# Patient Record
Sex: MALE | Race: BLACK OR AFRICAN AMERICAN | URBAN - METROPOLITAN AREA
[De-identification: names, ages, dates, MRNs, and addresses within clinical notes are randomized per-mention and may not be internally consistent; named-entity substitution may affect disease eponyms.]

---

## 2023-10-07 ENCOUNTER — INPATIENT (INPATIENT)
Facility: HOSPITAL | Age: 48
LOS: 3 days | Discharge: HOME | DRG: 274 | End: 2023-10-11
Attending: INTERNAL MEDICINE | Admitting: INTERNAL MEDICINE
Payer: COMMERCIAL

## 2023-10-07 VITALS
HEART RATE: 82 BPM | RESPIRATION RATE: 18 BRPM | SYSTOLIC BLOOD PRESSURE: 120 MMHG | DIASTOLIC BLOOD PRESSURE: 77 MMHG | OXYGEN SATURATION: 96 %

## 2023-10-07 DIAGNOSIS — Z98.890 OTHER SPECIFIED POSTPROCEDURAL STATES: Chronic | ICD-10-CM

## 2023-10-07 DIAGNOSIS — R55 SYNCOPE AND COLLAPSE: ICD-10-CM

## 2023-10-07 DIAGNOSIS — E78.5 HYPERLIPIDEMIA, UNSPECIFIED: ICD-10-CM

## 2023-10-07 DIAGNOSIS — I10 ESSENTIAL (PRIMARY) HYPERTENSION: ICD-10-CM

## 2023-10-07 DIAGNOSIS — Z86.79 PERSONAL HISTORY OF OTHER DISEASES OF THE CIRCULATORY SYSTEM: ICD-10-CM

## 2023-10-07 DIAGNOSIS — I50.22 CHRONIC SYSTOLIC (CONGESTIVE) HEART FAILURE: ICD-10-CM

## 2023-10-07 DIAGNOSIS — I48.91 UNSPECIFIED ATRIAL FIBRILLATION: ICD-10-CM

## 2023-10-07 DIAGNOSIS — S21.339A PUNCTURE WOUND WITHOUT FOREIGN BODY OF UNSPECIFIED FRONT WALL OF THORAX WITH PENETRATION INTO THORACIC CAVITY, INITIAL ENCOUNTER: Chronic | ICD-10-CM

## 2023-10-07 DIAGNOSIS — M19.90 UNSPECIFIED OSTEOARTHRITIS, UNSPECIFIED SITE: ICD-10-CM

## 2023-10-07 LAB
A1C WITH ESTIMATED AVERAGE GLUCOSE RESULT: 5.1 % — SIGNIFICANT CHANGE UP (ref 4–5.6)
ALBUMIN SERPL ELPH-MCNC: 4 G/DL — SIGNIFICANT CHANGE UP (ref 3.3–5)
ALP SERPL-CCNC: 91 U/L — SIGNIFICANT CHANGE UP (ref 40–120)
ALT FLD-CCNC: 22 U/L — SIGNIFICANT CHANGE UP (ref 10–45)
ANION GAP SERPL CALC-SCNC: 14 MMOL/L — SIGNIFICANT CHANGE UP (ref 5–17)
AST SERPL-CCNC: 22 U/L — SIGNIFICANT CHANGE UP (ref 10–40)
BILIRUB SERPL-MCNC: 0.6 MG/DL — SIGNIFICANT CHANGE UP (ref 0.2–1.2)
BUN SERPL-MCNC: 21 MG/DL — SIGNIFICANT CHANGE UP (ref 7–23)
CALCIUM SERPL-MCNC: 9.7 MG/DL — SIGNIFICANT CHANGE UP (ref 8.4–10.5)
CHLORIDE SERPL-SCNC: 104 MMOL/L — SIGNIFICANT CHANGE UP (ref 96–108)
CHOLEST SERPL-MCNC: 130 MG/DL — SIGNIFICANT CHANGE UP
CO2 SERPL-SCNC: 20 MMOL/L — LOW (ref 22–31)
CREAT SERPL-MCNC: 1.19 MG/DL — SIGNIFICANT CHANGE UP (ref 0.5–1.3)
EGFR: 74 ML/MIN/1.73M2 — SIGNIFICANT CHANGE UP
ESTIMATED AVERAGE GLUCOSE: 100 MG/DL — SIGNIFICANT CHANGE UP (ref 68–114)
GLUCOSE SERPL-MCNC: 158 MG/DL — HIGH (ref 70–99)
HCT VFR BLD CALC: 42.7 % — SIGNIFICANT CHANGE UP (ref 39–50)
HDLC SERPL-MCNC: 65 MG/DL — SIGNIFICANT CHANGE UP
HGB BLD-MCNC: 13.8 G/DL — SIGNIFICANT CHANGE UP (ref 13–17)
LIPID PNL WITH DIRECT LDL SERPL: 54 MG/DL — SIGNIFICANT CHANGE UP
MAGNESIUM SERPL-MCNC: 2.3 MG/DL — SIGNIFICANT CHANGE UP (ref 1.6–2.6)
MCHC RBC-ENTMCNC: 32 PG — SIGNIFICANT CHANGE UP (ref 27–34)
MCHC RBC-ENTMCNC: 32.3 GM/DL — SIGNIFICANT CHANGE UP (ref 32–36)
MCV RBC AUTO: 99.1 FL — SIGNIFICANT CHANGE UP (ref 80–100)
NON HDL CHOLESTEROL: 65 MG/DL — SIGNIFICANT CHANGE UP
NRBC # BLD: 0 /100 WBCS — SIGNIFICANT CHANGE UP (ref 0–0)
PLATELET # BLD AUTO: 200 K/UL — SIGNIFICANT CHANGE UP (ref 150–400)
POTASSIUM SERPL-MCNC: 4.2 MMOL/L — SIGNIFICANT CHANGE UP (ref 3.5–5.3)
POTASSIUM SERPL-SCNC: 4.2 MMOL/L — SIGNIFICANT CHANGE UP (ref 3.5–5.3)
PROT SERPL-MCNC: 7.5 G/DL — SIGNIFICANT CHANGE UP (ref 6–8.3)
RBC # BLD: 4.31 M/UL — SIGNIFICANT CHANGE UP (ref 4.2–5.8)
RBC # FLD: 12.8 % — SIGNIFICANT CHANGE UP (ref 10.3–14.5)
SODIUM SERPL-SCNC: 138 MMOL/L — SIGNIFICANT CHANGE UP (ref 135–145)
TRIGL SERPL-MCNC: 53 MG/DL — SIGNIFICANT CHANGE UP
WBC # BLD: 4.46 K/UL — SIGNIFICANT CHANGE UP (ref 3.8–10.5)
WBC # FLD AUTO: 4.46 K/UL — SIGNIFICANT CHANGE UP (ref 3.8–10.5)

## 2023-10-07 PROCEDURE — 99223 1ST HOSP IP/OBS HIGH 75: CPT

## 2023-10-07 PROCEDURE — 93306 TTE W/DOPPLER COMPLETE: CPT | Mod: 26

## 2023-10-07 PROCEDURE — 93010 ELECTROCARDIOGRAM REPORT: CPT

## 2023-10-07 RX ORDER — SACUBITRIL AND VALSARTAN 24; 26 MG/1; MG/1
1 TABLET, FILM COATED ORAL
Refills: 0 | Status: DISCONTINUED | OUTPATIENT
Start: 2023-10-07 | End: 2023-10-11

## 2023-10-07 RX ORDER — METOPROLOL TARTRATE 50 MG
50 TABLET ORAL ONCE
Refills: 0 | Status: DISCONTINUED | OUTPATIENT
Start: 2023-10-07 | End: 2023-10-07

## 2023-10-07 RX ORDER — LIDOCAINE 4 G/100G
1 CREAM TOPICAL ONCE
Refills: 0 | Status: COMPLETED | OUTPATIENT
Start: 2023-10-07 | End: 2023-10-08

## 2023-10-07 RX ORDER — ATORVASTATIN CALCIUM 80 MG/1
40 TABLET, FILM COATED ORAL AT BEDTIME
Refills: 0 | Status: DISCONTINUED | OUTPATIENT
Start: 2023-10-07 | End: 2023-10-11

## 2023-10-07 RX ORDER — APIXABAN 2.5 MG/1
5 TABLET, FILM COATED ORAL
Refills: 0 | Status: DISCONTINUED | OUTPATIENT
Start: 2023-10-07 | End: 2023-10-11

## 2023-10-07 RX ORDER — INFLUENZA VIRUS VACCINE 15; 15; 15; 15 UG/.5ML; UG/.5ML; UG/.5ML; UG/.5ML
0.5 SUSPENSION INTRAMUSCULAR ONCE
Refills: 0 | Status: DISCONTINUED | OUTPATIENT
Start: 2023-10-07 | End: 2023-10-11

## 2023-10-07 RX ORDER — DAPAGLIFLOZIN 10 MG/1
10 TABLET, FILM COATED ORAL DAILY
Refills: 0 | Status: DISCONTINUED | OUTPATIENT
Start: 2023-10-07 | End: 2023-10-08

## 2023-10-07 RX ORDER — METOPROLOL TARTRATE 50 MG
50 TABLET ORAL DAILY
Refills: 0 | Status: DISCONTINUED | OUTPATIENT
Start: 2023-10-07 | End: 2023-10-11

## 2023-10-07 RX ORDER — METOPROLOL TARTRATE 50 MG
50 TABLET ORAL
Refills: 0 | Status: DISCONTINUED | OUTPATIENT
Start: 2023-10-07 | End: 2023-10-07

## 2023-10-07 RX ADMIN — SACUBITRIL AND VALSARTAN 1 TABLET(S): 24; 26 TABLET, FILM COATED ORAL at 22:16

## 2023-10-07 RX ADMIN — SACUBITRIL AND VALSARTAN 1 TABLET(S): 24; 26 TABLET, FILM COATED ORAL at 11:30

## 2023-10-07 RX ADMIN — ATORVASTATIN CALCIUM 40 MILLIGRAM(S): 80 TABLET, FILM COATED ORAL at 22:14

## 2023-10-07 RX ADMIN — APIXABAN 5 MILLIGRAM(S): 2.5 TABLET, FILM COATED ORAL at 10:15

## 2023-10-07 RX ADMIN — Medication 50 MILLIGRAM(S): at 10:14

## 2023-10-07 RX ADMIN — DAPAGLIFLOZIN 10 MILLIGRAM(S): 10 TABLET, FILM COATED ORAL at 10:14

## 2023-10-07 RX ADMIN — APIXABAN 5 MILLIGRAM(S): 2.5 TABLET, FILM COATED ORAL at 22:14

## 2023-10-07 NOTE — CONSULT NOTE ADULT - PROBLEM SELECTOR RECOMMENDATION 3
Uncertain etiology of CHF, but reportedly with recovered EF. Unclear why he still has a LifeVest.    -discuss with outpatient Cardiologist  -if no history of VT and EF >35% on echo, LifeVest does not clearly offer benefit; if he meets indications for primary prevention ICD, this could be considered

## 2023-10-07 NOTE — H&P ADULT - NSICDXPASTMEDICALHX_GEN_ALL_CORE_FT
PAST MEDICAL HISTORY:  Arthritis     H/O rheumatic heart disease     Heart failure with mildly reduced ejection fraction (HFmrEF)     HLD (hyperlipidemia)     HTN (hypertension)

## 2023-10-07 NOTE — H&P ADULT - MUSCULOSKELETAL
normal/ROM intact/normal gait/strength 5/5 bilateral upper extremities/strength 5/5 bilateral lower extremities normal/ROM intact/calf tenderness/normal gait/strength 5/5 bilateral upper extremities/decreased strength

## 2023-10-07 NOTE — CONSULT NOTE ADULT - ASSESSMENT
48-year-old man with history of hypertension, atrial fibrillation/flutter, nonischemic cardiomyopathy with low normal EF, but still wearing a LifeVest for unclear reasons, atrial fibrillation/flutter with associated hypercoagulable state on long-term anticoagulation with Eliquis, 2 prior cardiac ablations (unclear details) at [facility], and now recurrence of dizziness/syncope transferred from [facility] with reports of atrial fibrillation with RVR associated with his symptoms for repeat ablation.  We will be crucial to understand what he has had done prior in order to plan for this procedure.  The LifeVest strips of his episodes will also be helpful to obtain if possible.    Case discussed with Dr. Major. 48-year-old man with history of hypertension, atrial fibrillation/flutter, nonischemic cardiomyopathy with low normal EF, but still wearing a LifeVest for unclear reasons, atrial fibrillation/flutter with associated hypercoagulable state on long-term anticoagulation with Eliquis, 2 prior cardiac ablations (unclear details) at [facility], and now recurrence of dizziness/syncope transferred from [facility] with reports of atrial fibrillation with RVR associated with his symptoms for repeat ablation.  We will be crucial to understand what he has had done prior in order to plan for this procedure.  The LifeVest strips of his episodes will also be helpful to obtain if possible.    Case discussed with Dr. Major. A total of 70 minutes was spent with >50% on counseling and coordination of care.

## 2023-10-07 NOTE — H&P ADULT - NSICDXFAMILYHX_GEN_ALL_CORE_FT
FAMILY HISTORY:  Father  Still living? Unknown  FH: CHF (congestive heart failure), Age at diagnosis: Age Unknown    Sibling  Still living? Unknown  FHx: atrial fibrillation, Age at diagnosis: Age Unknown

## 2023-10-07 NOTE — H&P ADULT - PROBLEM SELECTOR PLAN 1
HPI, CHADSVASC, Cardiologist/EP  -EKG  -BNP, TSH  -Trop  -Echo  -Home meds:  -AC:  -Rate control:  -EP consulted: Plan for SHOAIB/DCCV/Ablation? By report, presentation consistent with AFib probably with RVR.  -EKG 10/7: NSR @   -F/u TSH, FT4  -Trop neg x 2 from [facility]  -Echo 10/7:  -obtain OSH records and upload to chart with ECGs and cardiac testing if available  -given [id number] of at least 2 for HTN and CHF, if there was any interruption in his a/c he will need his ROSITA cleared prior to ablation (could be done with SHOAIB, cardiac CTA, or ICE imaging at the time of the procedure); overall likely low risk given sinus rhythm and no reported interruption from the patient's standpoint  -please obtain prior ablation records from [facility]  -continue metoprolol succinate 50mg daily  -check CBC, BMP, Mg, TSH, FT4, coags, LFTs prior to Monday  -tentative plan for ablation Monday with Dr. Major; please keep NPO Sunday night after midnight  -c/w Eliquis 5mg PO BID and metoprolol 50mg po qd  -keep on telemetry By report, presentation consistent with AFib probably with RVR.  -EKG 10/7: NSR @   -F/u TSH, FT4  [facility]  -Echo 10/7: NSR @ 66 BPM. w/ LVH  -obtain OSH records and upload to chart with ECGs and cardiac testing if available  -given [id number] of at least 2 for HTN and CHF, if there was any interruption in his a/c he will need his ROSITA cleared prior to ablation (could be done with SHOAIB, cardiac CTA, or ICE imaging at the time of the procedure); overall likely low risk given sinus rhythm and no reported interruption from the patient's standpoint  -please obtain prior ablation records from [facility]  -continue metoprolol succinate 50mg daily  -check CBC, BMP, Mg, TSH, FT4, coags, LFTs prior to Monday  -tentative plan for ablation Monday with Dr. Major; please keep NPO Sunday night after midnight  -c/w Eliquis 5mg PO BID and metoprolol 50mg po qd  -keep on telemetry By report, presentation consistent with AFib probably with RVR.  -Echo 10/7: NSR @ 66 BPM. w/ LVH  -F/u TSH, FT4  -Trop neg x 2 from [facility]  -TTE 10/7: LVEF 50% with global hypokinesis, Grade I LV DD, RV size and function normal, No significant valvular disease, No pericardial effusion.  -obtain OSH records and upload to chart with ECGs and cardiac testing if available  -given [id number] of at least 2 for HTN and CHF, if there was any interruption in his a/c he will need his ROSITA cleared prior to ablation (could be done with SHOAIB, cardiac CTA, or ICE imaging at the time of the procedure); overall likely low risk given sinus rhythm and no reported interruption from the patient's standpoint  -please obtain prior ablation records from [facility]  -continue metoprolol succinate 50mg daily  -check CBC, BMP, Mg, TSH, FT4, coags, LFTs prior to Monday  -tentative plan for ablation Monday with Dr. Major; please keep NPO Sunday night after midnight  -c/w Eliquis 5mg PO BID and metoprolol 50mg po qd  -keep on telemetry

## 2023-10-07 NOTE — H&P ADULT - PROBLEM SELECTOR PLAN 2
h/o HF ________________, experiencing _________, exam and results consistent with CHF exacerbation   -Current resp status -   -CE -   -EKG -  -CXR -   -ECHO -   -Home diuretic -  -Meds -   -Strict I/Os - Net _____, last 24 _____, Goal _______  -(Plan for ischemic workup?)  -Daily weights, _____ 1L PO restriction Per Colette note pt has h/o of EF as low as 20% now recovered to 50%.   -CE neg x 2 at [facility]  -EKG as above  -CXR neg at [facility]  -c/w home meds: Farxiga 10mg, entresto 24/26mg PO BID, metoprolol 50mg qd  -Daily weights, 1L PO restriction  -if no history of VT and EF >35% on echo, LifeVest does not clearly offer benefit; if he meets indications for primary prevention ICD, this could be considered.

## 2023-10-07 NOTE — H&P ADULT - PROBLEM SELECTOR PLAN 5
h/o HLD, (not) on home statin  -c/w home med: ________  -f/u lipid panel h/o HLD   - home med: rosuvastatin 10mg qd  - C/w atorvastatin 40mg PO qd  -f/u lipid panel
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
## 2023-10-07 NOTE — H&P ADULT - PROBLEM SELECTOR PLAN 3
Recommend looking at echo to determine whether he has evidence of true RHD. If so, Coumadin is recommended rather than NOACs.
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
## 2023-10-07 NOTE — CONSULT NOTE ADULT - PROBLEM SELECTOR RECOMMENDATION 9
Symptoms/episodes seem related to AFib w/ RVR, although additional records would be very helpful.    -obtain LifeVest strips  -telemetry while in house  -fall precautions  -recommend LE dopplers given his right lower leg discomfort and immobility to check for DVT

## 2023-10-07 NOTE — PATIENT PROFILE ADULT - FALL HARM RISK - HARM RISK INTERVENTIONS
Assistance with ambulation/Assistance OOB with selected safe patient handling equipment/Communicate Risk of Fall with Harm to all staff/Discuss with provider need for PT consult/Monitor gait and stability/Provide patient with walking aids - walker, cane, crutches/Reinforce activity limits and safety measures with patient and family/Sit up slowly, dangle for a short time, stand at bedside before walking/Tailored Fall Risk Interventions/Visual Cue: Yellow wristband and red socks/Bed in lowest position, wheels locked, appropriate side rails in place/Call bell, personal items and telephone in reach/Instruct patient to call for assistance before getting out of bed or chair/Non-slip footwear when patient is out of bed/[place] to call system/Physically safe environment - no spills, clutter or unnecessary equipment/Purposeful Proactive Rounding/Room/bathroom lighting operational, light cord in reach

## 2023-10-07 NOTE — H&P ADULT - PROBLEM SELECTOR PLAN 6
F: None  E: Replete if K<4 or Mag<2  N: DASH Diet  GIppx:   VTEppx: ELiquis 5mg BID  Dispo: [facility] 10/9 Lidocaine patch prn for back pain      F: None  E: Replete if K<4 or Mag<2  N: DASH Diet  GIppx:   VTEppx: ELiquis 5mg BID  Dispo: [facility] 10/9

## 2023-10-07 NOTE — H&P ADULT - NSHPLABSRESULTS_GEN_ALL_CORE
13.8   4.46  )-----------( 200      ( 07 Oct 2023 11:45 )             42.7       10-07    138  |  104  |  21  ----------------------------<  158<H>  4.2   |  20<L>  |  1.19    Ca    9.7      07 Oct 2023 11:45  Mg     2.3     10-07    TPro  7.5  /  Alb  4.0  /  TBili  0.6  /  DBili  x   /  AST  22  /  ALT  22  /  AlkPhos  91  10-07                Urinalysis Basic - ( 07 Oct 2023 11:45 )    Color: x / Appearance: x / SG: x / pH: x  Gluc: 158 mg/dL / Ketone: x  / Bili: x / Urobili: x   Blood: x / Protein: x / Nitrite: x   Leuk Esterase: x / RBC: x / WBC x   Sq Epi: x / Non Sq Epi: x / Bacteria: x        EKG: 13.8   4.46  )-----------( 200      ( 07 Oct 2023 11:45 )             42.7       10-07    138  |  104  |  21  ----------------------------<  158<H>  4.2   |  20<L>  |  1.19    Ca    9.7      07 Oct 2023 11:45  Mg     2.3     10-07    TPro  7.5  /  Alb  4.0  /  TBili  0.6  /  DBili  x   /  AST  22  /  ALT  22  /  AlkPhos  91  10-07                Urinalysis Basic - ( 07 Oct 2023 11:45 )    Color: x / Appearance: x / SG: x / pH: x  Gluc: 158 mg/dL / Ketone: x  / Bili: x / Urobili: x   Blood: x / Protein: x / Nitrite: x   Leuk Esterase: x / RBC: x / WBC x   Sq Epi: x / Non Sq Epi: x / Bacteria: x        EKG NSR @ 66 BPM. w/ LVH

## 2023-10-07 NOTE — H&P ADULT - PROBLEM SELECTOR PLAN 4
h/o HTN, BP (stable____)  -vitals q4h  -c/w home meds:_______ h/o HTN, /77  -vitals q4h  -c/w home meds: Farxiga 10mg, entresto 24/26mg PO BID, metoprolol 50mg qd

## 2023-10-07 NOTE — CONSULT NOTE ADULT - PROBLEM SELECTOR RECOMMENDATION 2
By report, presentation consistent with AFib probably with RVR.    -obtain OSH records and upload to chart with ECGs and cardiac testing if available  -repeat TTE here  -given [id number] of at least 2 for HTN and CHF, if there was any interruption in his a/c he will need his ROSITA cleared prior to ablation (could be done with SHOAIB, cardiac CTA, or ICE imaging at the time of the procedure); overall likely low risk given sinus rhythm and no reported interruption from the patient's standpoint  -please obtain prior ablation records from [facility]  -continue metoprolol succinate 50mg daily  -keep on telemetry  -check CBC, BMP, Mg, TSH, FT4, coags, LFTs prior to Monday  -tentative plan for ablation Monday with Dr. Major; please keep NPO Sunday night after midnight  -continue Eliquis without interruption

## 2023-10-08 LAB
ALBUMIN SERPL ELPH-MCNC: 3.9 G/DL — SIGNIFICANT CHANGE UP (ref 3.3–5)
ALP SERPL-CCNC: 92 U/L — SIGNIFICANT CHANGE UP (ref 40–120)
ALT FLD-CCNC: 17 U/L — SIGNIFICANT CHANGE UP (ref 10–45)
ANION GAP SERPL CALC-SCNC: 9 MMOL/L — SIGNIFICANT CHANGE UP (ref 5–17)
APTT BLD: 33.6 SEC — SIGNIFICANT CHANGE UP (ref 24.5–35.6)
AST SERPL-CCNC: 12 U/L — SIGNIFICANT CHANGE UP (ref 10–40)
BILIRUB DIRECT SERPL-MCNC: <0.2 MG/DL — SIGNIFICANT CHANGE UP (ref 0–0.3)
BILIRUB INDIRECT FLD-MCNC: SIGNIFICANT CHANGE UP MG/DL (ref 0.2–1)
BILIRUB SERPL-MCNC: 0.7 MG/DL — SIGNIFICANT CHANGE UP (ref 0.2–1.2)
BUN SERPL-MCNC: 19 MG/DL — SIGNIFICANT CHANGE UP (ref 7–23)
CALCIUM SERPL-MCNC: 9.4 MG/DL — SIGNIFICANT CHANGE UP (ref 8.4–10.5)
CHLORIDE SERPL-SCNC: 108 MMOL/L — SIGNIFICANT CHANGE UP (ref 96–108)
CO2 SERPL-SCNC: 21 MMOL/L — LOW (ref 22–31)
CREAT SERPL-MCNC: 1.01 MG/DL — SIGNIFICANT CHANGE UP (ref 0.5–1.3)
EGFR: 91 ML/MIN/1.73M2 — SIGNIFICANT CHANGE UP
GLUCOSE SERPL-MCNC: 101 MG/DL — HIGH (ref 70–99)
HCT VFR BLD CALC: 43.4 % — SIGNIFICANT CHANGE UP (ref 39–50)
HGB BLD-MCNC: 13.8 G/DL — SIGNIFICANT CHANGE UP (ref 13–17)
INR BLD: 0.95 — SIGNIFICANT CHANGE UP (ref 0.85–1.18)
MAGNESIUM SERPL-MCNC: 2.1 MG/DL — SIGNIFICANT CHANGE UP (ref 1.6–2.6)
MCHC RBC-ENTMCNC: 31.6 PG — SIGNIFICANT CHANGE UP (ref 27–34)
MCHC RBC-ENTMCNC: 31.8 GM/DL — LOW (ref 32–36)
MCV RBC AUTO: 99.3 FL — SIGNIFICANT CHANGE UP (ref 80–100)
NRBC # BLD: 0 /100 WBCS — SIGNIFICANT CHANGE UP (ref 0–0)
PLATELET # BLD AUTO: 187 K/UL — SIGNIFICANT CHANGE UP (ref 150–400)
POTASSIUM SERPL-MCNC: 4.2 MMOL/L — SIGNIFICANT CHANGE UP (ref 3.5–5.3)
POTASSIUM SERPL-SCNC: 4.2 MMOL/L — SIGNIFICANT CHANGE UP (ref 3.5–5.3)
PROT SERPL-MCNC: 7.3 G/DL — SIGNIFICANT CHANGE UP (ref 6–8.3)
PROTHROM AB SERPL-ACNC: 10.9 SEC — SIGNIFICANT CHANGE UP (ref 9.5–13)
RBC # BLD: 4.37 M/UL — SIGNIFICANT CHANGE UP (ref 4.2–5.8)
RBC # FLD: 12.8 % — SIGNIFICANT CHANGE UP (ref 10.3–14.5)
SODIUM SERPL-SCNC: 138 MMOL/L — SIGNIFICANT CHANGE UP (ref 135–145)
T4 FREE SERPL-MCNC: 1.22 NG/DL — SIGNIFICANT CHANGE UP (ref 0.93–1.7)
TSH SERPL-MCNC: 3.76 UIU/ML — SIGNIFICANT CHANGE UP (ref 0.27–4.2)
WBC # BLD: 4.56 K/UL — SIGNIFICANT CHANGE UP (ref 3.8–10.5)
WBC # FLD AUTO: 4.56 K/UL — SIGNIFICANT CHANGE UP (ref 3.8–10.5)

## 2023-10-08 PROCEDURE — 93970 EXTREMITY STUDY: CPT | Mod: 26

## 2023-10-08 PROCEDURE — 99233 SBSQ HOSP IP/OBS HIGH 50: CPT

## 2023-10-08 RX ORDER — SPIRONOLACTONE 25 MG/1
25 TABLET, FILM COATED ORAL DAILY
Refills: 0 | Status: DISCONTINUED | OUTPATIENT
Start: 2023-10-08 | End: 2023-10-11

## 2023-10-08 RX ADMIN — APIXABAN 5 MILLIGRAM(S): 2.5 TABLET, FILM COATED ORAL at 11:01

## 2023-10-08 RX ADMIN — LIDOCAINE 1 PATCH: 4 CREAM TOPICAL at 08:41

## 2023-10-08 RX ADMIN — Medication 50 MILLIGRAM(S): at 06:52

## 2023-10-08 RX ADMIN — SACUBITRIL AND VALSARTAN 1 TABLET(S): 24; 26 TABLET, FILM COATED ORAL at 22:05

## 2023-10-08 RX ADMIN — SACUBITRIL AND VALSARTAN 1 TABLET(S): 24; 26 TABLET, FILM COATED ORAL at 11:01

## 2023-10-08 RX ADMIN — ATORVASTATIN CALCIUM 40 MILLIGRAM(S): 80 TABLET, FILM COATED ORAL at 22:05

## 2023-10-08 RX ADMIN — DAPAGLIFLOZIN 10 MILLIGRAM(S): 10 TABLET, FILM COATED ORAL at 11:01

## 2023-10-08 RX ADMIN — LIDOCAINE 1 PATCH: 4 CREAM TOPICAL at 19:02

## 2023-10-08 RX ADMIN — APIXABAN 5 MILLIGRAM(S): 2.5 TABLET, FILM COATED ORAL at 22:04

## 2023-10-08 RX ADMIN — SPIRONOLACTONE 25 MILLIGRAM(S): 25 TABLET, FILM COATED ORAL at 16:02
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
## 2023-10-08 NOTE — PROGRESS NOTE ADULT - PROBLEM SELECTOR PLAN 4
h/o HTN, /77  -vitals q4h  -c/w home meds: Farxiga 10mg, entresto 24/26mg PO BID, metoprolol 50mg qd

## 2023-10-08 NOTE — PROGRESS NOTE ADULT - PROBLEM SELECTOR PLAN 6
Lidocaine patch prn for back pain      F: None  E: Replete if K<4 or Mag<2  N: DASH Diet  GIppx:   VTEppx: ELiquis 5mg BID  Dispo: [facility] 10/9

## 2023-10-08 NOTE — PROGRESS NOTE ADULT - SUBJECTIVE AND OBJECTIVE BOX
INCOMPLETE    OVERNIGHT EVENTS:  No acute events overnight.    SUBJECTIVE / INTERVAL HPI: Patient seen and examined at bedside.    Denies CP, SOB, dizziness/lightheadedness, palpitations    12 point ROS otherwise negative    VITAL SIGNS:  Vital Signs Last 24 Hrs  T(C): 36.7 (08 Oct 2023 06:51), Max: 36.8 (07 Oct 2023 16:55)  T(F): 98 (08 Oct 2023 06:51), Max: 98.3 (07 Oct 2023 16:55)  HR: 76 (08 Oct 2023 06:51) (64 - 82)  BP: 137/80 (08 Oct 2023 06:51) (100/57 - 137/80)  BP(mean): 100 (08 Oct 2023 06:51) (72 - 100)  RR: 17 (08 Oct 2023 06:51) (16 - 19)  SpO2: 97% (08 Oct 2023 06:51) (96% - 98%)    Parameters below as of 08 Oct 2023 06:51  Patient On (Oxygen Delivery Method): room air          I&O's Summary    07 Oct 2023 07:01  -  08 Oct 2023 07:00  --------------------------------------------------------  IN: 0 mL / OUT: 1050 mL / NET: -1050 mL      Height (cm): 170.2 (10-07 @ 16:03)  Weight (kg): 63.5 (10-07 @ 16:03)  BMI (kg/m2): 21.9 (10-07 @ 16:03)  BSA (m2): 1.74 (10-07 @ 16:03)    PHYSICAL EXAM:    General: sitting up in bed, NAD  HEENT: conjunctiva clear; MMM  Neck: supple, no JVD  Cardiovascular: RRR, no murmurs  Respiratory: CTA B/L  Gastrointestinal: soft, NT/ND, +BS  Extremities: WWP, no edema or cyanosis  Vascular: Peripheral pulses palpable 2+ bilaterally/ carotid 2+ b/l, no bruits; radial 2+ b/l; femoral 2+ b/l no bruits; DP/PT 2+ b/l  Neurological: AAOx3, no focal deficits    MEDICATIONS:  MEDICATIONS  (STANDING):  apixaban 5 milliGRAM(s) Oral two times a day  atorvastatin 40 milliGRAM(s) Oral at bedtime  dapagliflozin 10 milliGRAM(s) Oral daily  influenza   Vaccine 0.5 milliLiter(s) IntraMuscular once  metoprolol succinate ER 50 milliGRAM(s) Oral daily  sacubitril 24 mG/valsartan 26 mG 1 Tablet(s) Oral two times a day    MEDICATIONS  (PRN):  lidocaine   4% Patch 1 Patch Transdermal once PRN Back pain      LABS:                        13.8   4.56  )-----------( 187      ( 08 Oct 2023 06:53 )             43.4       10-08    138  |  108  |  19  ----------------------------<  101<H>  4.2   |  x   |  1.01    Ca    9.4      08 Oct 2023 06:53  Mg     2.1     10-08    TPro  7.5  /  Alb  4.0  /  TBili  0.6  /  DBili  x   /  AST  22  /  ALT  22  /  AlkPhos  91  10-07      PT/INR - ( 08 Oct 2023 06:53 )   PT: 10.9 sec;   INR: 0.95          PTT - ( 08 Oct 2023 06:53 )  PTT:33.6 sec          TELEMETRY:    RADIOLOGY & ADDITIONAL TESTS: Reviewed. OVERNIGHT EVENTS:  No acute events overnight. Patient sitting in bed with sunglasses and hat on looking comfortable complaining of CP that has been there for weeks also complaining of right leg pain. Slept well with no concerns or CP or palpations      SUBJECTIVE / INTERVAL HPI: Patient seen and examined at bedside.    Denies  SOB, dizziness/lightheadedness, palpitations    12 point ROS otherwise negative    VITAL SIGNS:  Vital Signs Last 24 Hrs  T(C): 36.7 (08 Oct 2023 06:51), Max: 36.8 (07 Oct 2023 16:55)  T(F): 98 (08 Oct 2023 06:51), Max: 98.3 (07 Oct 2023 16:55)  HR: 76 (08 Oct 2023 06:51) (64 - 82)  BP: 137/80 (08 Oct 2023 06:51) (100/57 - 137/80)  BP(mean): 100 (08 Oct 2023 06:51) (72 - 100)  RR: 17 (08 Oct 2023 06:51) (16 - 19)  SpO2: 97% (08 Oct 2023 06:51) (96% - 98%)    Parameters below as of 08 Oct 2023 06:51  Patient On (Oxygen Delivery Method): room air          I&O's Summary    07 Oct 2023 07:01  -  08 Oct 2023 07:00  --------------------------------------------------------  IN: 0 mL / OUT: 1050 mL / NET: -1050 mL      Height (cm): 170.2 (10-07 @ 16:03)  Weight (kg): 63.5 (10-07 @ 16:03)  BMI (kg/m2): 21.9 (10-07 @ 16:03)  BSA (m2): 1.74 (10-07 @ 16:03)    PHYSICAL EXAM:    General: sitting up in bed, NAD  HEENT: conjunctiva clear; MMM  Neck: supple, no JVD  Cardiovascular: RRR, no murmurs  Respiratory: CTA B/L  Gastrointestinal: soft, NT/ND, +BS  Extremities: WWP, no edema or cyanosis  Vascular: Peripheral pulses palpable 2+ bilaterally/ carotid 2+ b/l, no bruits; radial 2+ b/l; femoral 2+ b/l no bruits; DP/PT 2+ b/l  Neurological: AAOx3, no focal deficits    MEDICATIONS:  MEDICATIONS  (STANDING):  apixaban 5 milliGRAM(s) Oral two times a day  atorvastatin 40 milliGRAM(s) Oral at bedtime  dapagliflozin 10 milliGRAM(s) Oral daily  influenza   Vaccine 0.5 milliLiter(s) IntraMuscular once  metoprolol succinate ER 50 milliGRAM(s) Oral daily  sacubitril 24 mG/valsartan 26 mG 1 Tablet(s) Oral two times a day    MEDICATIONS  (PRN):  lidocaine   4% Patch 1 Patch Transdermal once PRN Back pain      LABS:                        13.8   4.56  )-----------( 187      ( 08 Oct 2023 06:53 )             43.4       10-08    138  |  108  |  19  ----------------------------<  101<H>  4.2   |  x   |  1.01    Ca    9.4      08 Oct 2023 06:53  Mg     2.1     10-08    TPro  7.5  /  Alb  4.0  /  TBili  0.6  /  DBili  x   /  AST  22  /  ALT  22  /  AlkPhos  91  10-07      PT/INR - ( 08 Oct 2023 06:53 )   PT: 10.9 sec;   INR: 0.95          PTT - ( 08 Oct 2023 06:53 )  PTT:33.6 sec          TELEMETRY:    RADIOLOGY & ADDITIONAL TESTS: Reviewed.

## 2023-10-08 NOTE — PROGRESS NOTE ADULT - PROBLEM SELECTOR PLAN 1
By report, presentation consistent with AFib probably with RVR.  - Currently in NSR 10/8/2023   -Echo 10/7: NSR @ 66 BPM. w/ LVH  -F/u TSH, FT4  -Trop neg x 2 from [facility]  -TTE 10/7: LVEF 50% with global hypokinesis, Grade I LV DD, RV size and function normal, No significant valvular disease, No pericardial effusion.  -obtain OSH records and upload to chart with ECGs and cardiac testing if available  -given [id number] of at least 2 for HTN and CHF, if there was any interruption in his a/c he will need his ROSITA cleared prior to ablation (could be done with SHOAIB, cardiac CTA, or ICE imaging at the time of the procedure); overall likely low risk given sinus rhythm and no reported interruption from the patient's standpoint  -please obtain prior ablation records from [facility]  -continue metoprolol succinate 50mg daily  -check CBC, BMP, Mg, TSH, FT4, coags, LFTs prior to Monday  -tentative plan for ablation Monday with Dr. Major; please keep NPO Sunday night after midnight  -c/w Eliquis 5mg PO BID and metoprolol 50mg po qd  -keep on telemetry

## 2023-10-08 NOTE — PROGRESS NOTE ADULT - PROBLEM SELECTOR PLAN 2
Per [facility] note pt has h/o of EF as low as 20% now recovered to 50%.   -CE neg x 2 at [facility]  -EKG as above  -CXR neg at [facility]  -c/w home meds: Farxiga 10mg, entresto 24/26mg PO BID, metoprolol 50mg qd  -Daily weights, 1L PO restriction  -if no history of VT and EF >35% on echo, LifeVest does not clearly offer benefit; if he meets indications for primary prevention ICD, this could be considered.
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
## 2023-10-09 LAB
ANION GAP SERPL CALC-SCNC: 10 MMOL/L — SIGNIFICANT CHANGE UP (ref 5–17)
BUN SERPL-MCNC: 28 MG/DL — HIGH (ref 7–23)
CALCIUM SERPL-MCNC: 9.6 MG/DL — SIGNIFICANT CHANGE UP (ref 8.4–10.5)
CHLORIDE SERPL-SCNC: 106 MMOL/L — SIGNIFICANT CHANGE UP (ref 96–108)
CO2 SERPL-SCNC: 19 MMOL/L — LOW (ref 22–31)
CREAT SERPL-MCNC: 1.08 MG/DL — SIGNIFICANT CHANGE UP (ref 0.5–1.3)
EGFR: 84 ML/MIN/1.73M2 — SIGNIFICANT CHANGE UP
GLUCOSE SERPL-MCNC: 87 MG/DL — SIGNIFICANT CHANGE UP (ref 70–99)
HCT VFR BLD CALC: 42.6 % — SIGNIFICANT CHANGE UP (ref 39–50)
HGB BLD-MCNC: 14 G/DL — SIGNIFICANT CHANGE UP (ref 13–17)
INR BLD: 0.92 — SIGNIFICANT CHANGE UP (ref 0.85–1.18)
MAGNESIUM SERPL-MCNC: 2.4 MG/DL — SIGNIFICANT CHANGE UP (ref 1.6–2.6)
MCHC RBC-ENTMCNC: 32.1 PG — SIGNIFICANT CHANGE UP (ref 27–34)
MCHC RBC-ENTMCNC: 32.9 GM/DL — SIGNIFICANT CHANGE UP (ref 32–36)
MCV RBC AUTO: 97.7 FL — SIGNIFICANT CHANGE UP (ref 80–100)
NRBC # BLD: 0 /100 WBCS — SIGNIFICANT CHANGE UP (ref 0–0)
PLATELET # BLD AUTO: 215 K/UL — SIGNIFICANT CHANGE UP (ref 150–400)
POTASSIUM SERPL-MCNC: 4.7 MMOL/L — SIGNIFICANT CHANGE UP (ref 3.5–5.3)
POTASSIUM SERPL-SCNC: 4.7 MMOL/L — SIGNIFICANT CHANGE UP (ref 3.5–5.3)
PROTHROM AB SERPL-ACNC: 10.5 SEC — SIGNIFICANT CHANGE UP (ref 9.5–13)
RBC # BLD: 4.36 M/UL — SIGNIFICANT CHANGE UP (ref 4.2–5.8)
RBC # FLD: 12.7 % — SIGNIFICANT CHANGE UP (ref 10.3–14.5)
SODIUM SERPL-SCNC: 135 MMOL/L — SIGNIFICANT CHANGE UP (ref 135–145)
WBC # BLD: 5.64 K/UL — SIGNIFICANT CHANGE UP (ref 3.8–10.5)
WBC # FLD AUTO: 5.64 K/UL — SIGNIFICANT CHANGE UP (ref 3.8–10.5)

## 2023-10-09 PROCEDURE — 99232 SBSQ HOSP IP/OBS MODERATE 35: CPT

## 2023-10-09 PROCEDURE — 99233 SBSQ HOSP IP/OBS HIGH 50: CPT

## 2023-10-09 RX ORDER — LIDOCAINE 4 G/100G
1 CREAM TOPICAL EVERY 24 HOURS
Refills: 0 | Status: DISCONTINUED | OUTPATIENT
Start: 2023-10-09 | End: 2023-10-11

## 2023-10-09 RX ADMIN — LIDOCAINE 1 PATCH: 4 CREAM TOPICAL at 21:09

## 2023-10-09 RX ADMIN — Medication 50 MILLIGRAM(S): at 05:36

## 2023-10-09 RX ADMIN — APIXABAN 5 MILLIGRAM(S): 2.5 TABLET, FILM COATED ORAL at 21:09

## 2023-10-09 RX ADMIN — SACUBITRIL AND VALSARTAN 1 TABLET(S): 24; 26 TABLET, FILM COATED ORAL at 21:09

## 2023-10-09 RX ADMIN — SPIRONOLACTONE 25 MILLIGRAM(S): 25 TABLET, FILM COATED ORAL at 05:36

## 2023-10-09 RX ADMIN — SACUBITRIL AND VALSARTAN 1 TABLET(S): 24; 26 TABLET, FILM COATED ORAL at 10:15

## 2023-10-09 RX ADMIN — APIXABAN 5 MILLIGRAM(S): 2.5 TABLET, FILM COATED ORAL at 10:15

## 2023-10-09 RX ADMIN — ATORVASTATIN CALCIUM 40 MILLIGRAM(S): 80 TABLET, FILM COATED ORAL at 21:09

## 2023-10-09 NOTE — PROGRESS NOTE ADULT - SUBJECTIVE AND OBJECTIVE BOX
INCOMPLETE    OVERNIGHT EVENTS:  No acute events overnight.    SUBJECTIVE / INTERVAL HPI: Patient seen and examined at bedside.    Denies CP, SOB, dizziness/lightheadedness, palpitations    12 point ROS otherwise negative    VITAL SIGNS:  Vital Signs Last 24 Hrs  T(C): 36.7 (09 Oct 2023 10:14), Max: 36.7 (09 Oct 2023 05:35)  T(F): 98 (09 Oct 2023 10:14), Max: 98 (09 Oct 2023 05:35)  HR: 79 (09 Oct 2023 10:14) (71 - 79)  BP: 110/64 (09 Oct 2023 10:14) (103/57 - 110/86)  BP(mean): 80 (09 Oct 2023 10:14) (76 - 80)  RR: 18 (09 Oct 2023 05:35) (18 - 18)  SpO2: 94% (09 Oct 2023 05:35) (94% - 98%)    Parameters below as of 09 Oct 2023 05:35  Patient On (Oxygen Delivery Method): room air          I&O's Summary    08 Oct 2023 07:01  -  09 Oct 2023 07:00  --------------------------------------------------------  IN: 420 mL / OUT: 1550 mL / NET: -1130 mL    09 Oct 2023 07:01  -  09 Oct 2023 11:07  --------------------------------------------------------  IN: 0 mL / OUT: 200 mL / NET: -200 mL      Height (cm): 170.2 (10-09 @ 04:49)  Weight (kg): 63.5 (10-09 @ 04:49)  BMI (kg/m2): 21.9 (10-09 @ 04:49)  BSA (m2): 1.74 (10-09 @ 04:49)    PHYSICAL EXAM:    General: sitting up in bed, NAD  HEENT: conjunctiva clear; MMM  Neck: supple, no JVD  Cardiovascular: RRR, no murmurs  Respiratory: CTA B/L  Gastrointestinal: soft, NT/ND, +BS  Extremities: WWP, no edema or cyanosis  Vascular: Peripheral pulses palpable 2+ bilaterally/ carotid 2+ b/l, no bruits; radial 2+ b/l; femoral 2+ b/l no bruits; DP/PT 2+ b/l  Neurological: AAOx3, no focal deficits    MEDICATIONS:  MEDICATIONS  (STANDING):  apixaban 5 milliGRAM(s) Oral two times a day  atorvastatin 40 milliGRAM(s) Oral at bedtime  influenza   Vaccine 0.5 milliLiter(s) IntraMuscular once  metoprolol succinate ER 50 milliGRAM(s) Oral daily  sacubitril 24 mG/valsartan 26 mG 1 Tablet(s) Oral two times a day  spironolactone 25 milliGRAM(s) Oral daily    MEDICATIONS  (PRN):      LABS:                        14.0   5.64  )-----------( 215      ( 09 Oct 2023 05:30 )             42.6       10-09    135  |  106  |  28<H>  ----------------------------<  87  4.7   |  19<L>  |  1.08    Ca    9.6      09 Oct 2023 05:30  Mg     2.4     10-09    TPro  7.3  /  Alb  3.9  /  TBili  0.7  /  DBili  <0.2  /  AST  12  /  ALT  17  /  AlkPhos  92  10-08      PT/INR - ( 09 Oct 2023 05:30 )   PT: 10.5 sec;   INR: 0.92          PTT - ( 08 Oct 2023 06:53 )  PTT:33.6 sec          TELEMETRY:    RADIOLOGY & ADDITIONAL TESTS: Reviewed.

## 2023-10-09 NOTE — PROGRESS NOTE ADULT - PROBLEM SELECTOR PLAN 2
[facility] has h/o of EF as low as 20% now recovered to 50%.   -CE neg x 2 at [facility]  -EKG as above  -CXR neg at [facility]  -c/w home meds: Farxiga 10mg, entresto 24/26mg PO BID, metoprolol 50mg qd, started Spironolactone 25   -Daily weights, 1L PO restriction  -if no history of VT and EF >35% on echo, LifeVest does not clearly offer benefit; if he meets indications for primary prevention ICD, this could be considered.

## 2023-10-09 NOTE — PROGRESS NOTE ADULT - PROBLEM SELECTOR PLAN 1
By report, presentation consistent with AFib probably with RVR.  - Currently in NSR    -Echo 10/7: NSR @ 66 BPM. w/ LVH  Thyroid Panel Normal   -Trop neg x 2 from [facility]  -TTE 10/7: LVEF 50% with global hypokinesis, Grade I LV DD, RV size and function normal, No significant valvular disease, No pericardial effusion.  -given [id number] of at least 2 for HTN and CHF, if there was any interruption in his a/c he will need his ROSITA cleared prior to ablation (could be done with SHOAIB, cardiac CTA, or ICE imaging at the time of the procedure); overall likely low risk given sinus rhythm and no reported interruption from the patient's standpoint  -please obtain prior ablation records from [facility]  -continue metoprolol succinate 50mg daily  -tentative plan for ablation Tuesday with Dr. Major; please keep NPO Sunday night after midnight  -c/w Eliquis 5mg PO BID and metoprolol 50mg po qd  -keep on telemetry
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
## 2023-10-09 NOTE — PROGRESS NOTE ADULT - SUBJECTIVE AND OBJECTIVE BOX
EPS Progress Note    S: patient laying in bed comfortably with no complaints.  He is eating breakfast as he states he knew he would not have a procedure today.  No palpitations, chest pain, SOB or syncope    O: T(C): 36.7 (10-09-23 @ 10:14), Max: 36.7 (10-09-23 @ 05:35)  HR: 79 (10-09-23 @ 10:14) (71 - 79)  BP: 110/64 (10-09-23 @ 10:14) (103/57 - 110/86)  RR: 18 (10-09-23 @ 05:35) (18 - 18)  SpO2: 94% (10-09-23 @ 05:35) (94% - 98%)    TELE: NSR 70-80 with APCs    PHYSICAL  General:  NAD        Chest:  CTA B/L, no w/r/r  Cardiac:  RRR, + s1/s2    Abdomen:   soft ND/NT  Extremities: No edema   Skin: no rash noted, normal color and pigmentation  Psych: A&Ox3   Neuro: no deficit noted     LABS:                        14.0   5.64  )-----------( 215      ( 09 Oct 2023 05:30 )             42.6     135  |  106  |  28<H>  ----------------------------<  87  4.7   |  19<L>  |  1.08    Ca    9.6      09 Oct 2023 05:30  Mg     2.4     10-09    TPro  7.3  /  Alb  3.9  /  TBili  0.7  /  DBili  <0.2  /  AST  12  /  ALT  17  /  AlkPhos  92     PT: 10.5 sec;   INR: 0.92        PTT:33.6 sec      MEDICATIONS:  apixaban 5 milliGRAM(s) Oral two times a day  atorvastatin 40 milliGRAM(s) Oral at bedtime  influenza   Vaccine 0.5 milliLiter(s) IntraMuscular once  metoprolol succinate ER 50 milliGRAM(s) Oral daily  sacubitril 24 mG/valsartan 26 mG 1 Tablet(s) Oral two times a day  spironolactone 25 milliGRAM(s) Oral daily    < from: TTE Echo Complete w/o Contrast w/ Doppler (10.07.23 @ 12:38) >   1. Left ventricular systolic function is mildly reduced with a calculated ejection fraction of 50% with global hypokinesis.   2. Grade I left ventricular diastolic dysfunction.   3. The right ventricle is normal in size. Right ventricular systolic function is normal.   4. No significant valvular disease.   5. No evidence of pulmonary hypertension.   6. No pericardial effusion.   7. No prior echo is available for comparison.          ASSESSMENT/PLAN    48-year-old man with history of hypertension, atrial fibrillation/flutter, nonischemic cardiomyopathy with low normal EF, but still wearing a LifeVest for unclear reasons, atrial fibrillation/flutter with associated hypercoagulable state on long-term anticoagulation with Eliquis, 2 prior cardiac ablations (unclear details) at [facility], and now recurrence of dizziness/syncope transferred from [facility] with reports of atrial fibrillation with RVR associated with his symptoms for repeat ablation.  He states his last procedure was over the summer and was scheduled for another ablation.  The name of the doctor he lists is not found on the internet.  PLEASE TRY AND GET RECORDS OF PRIOR ABLATION AT [facility].  We will attempt to get strips of arrhythmia that transferred him here.  He states he missed no eliquis - will unlikley need SHOAIB can use ICE during procedure.     Please keep NPO after midnight.  Long term unclear why he needs a lifevest and will discuss with primary cardiologist.  Also he notes a history of rheumatic heart disease - if this is true Coumadin is recommended rather than NOACs.         EPS Progress Note    S: patient laying in bed comfortably with no complaints.  He is eating breakfast as he states he knew he would not have a procedure today.  No palpitations, chest pain, SOB or syncope    O: T(C): 36.7 (10-09-23 @ 10:14), Max: 36.7 (10-09-23 @ 05:35)  HR: 79 (10-09-23 @ 10:14) (71 - 79)  BP: 110/64 (10-09-23 @ 10:14) (103/57 - 110/86)  RR: 18 (10-09-23 @ 05:35) (18 - 18)  SpO2: 94% (10-09-23 @ 05:35) (94% - 98%)    TELE: NSR 70-80 with APCs    PHYSICAL  General:  NAD        Chest:  CTA B/L, no w/r/r  Cardiac:  RRR, + s1/s2    Abdomen:   soft ND/NT  Extremities: No edema   Skin: no rash noted, normal color and pigmentation  Psych: A&Ox3   Neuro: no deficit noted     LABS:                        14.0   5.64  )-----------( 215      ( 09 Oct 2023 05:30 )             42.6     135  |  106  |  28<H>  ----------------------------<  87  4.7   |  19<L>  |  1.08    Ca    9.6      09 Oct 2023 05:30  Mg     2.4     10-09    TPro  7.3  /  Alb  3.9  /  TBili  0.7  /  DBili  <0.2  /  AST  12  /  ALT  17  /  AlkPhos  92     PT: 10.5 sec;   INR: 0.92        PTT:33.6 sec      MEDICATIONS:  apixaban 5 milliGRAM(s) Oral two times a day  atorvastatin 40 milliGRAM(s) Oral at bedtime  influenza   Vaccine 0.5 milliLiter(s) IntraMuscular once  metoprolol succinate ER 50 milliGRAM(s) Oral daily  sacubitril 24 mG/valsartan 26 mG 1 Tablet(s) Oral two times a day  spironolactone 25 milliGRAM(s) Oral daily    < from: TTE Echo Complete w/o Contrast w/ Doppler (10.07.23 @ 12:38) >   1. Left ventricular systolic function is mildly reduced with a calculated ejection fraction of 50% with global hypokinesis.   2. Grade I left ventricular diastolic dysfunction.   3. The right ventricle is normal in size. Right ventricular systolic function is normal.   4. No significant valvular disease.   5. No evidence of pulmonary hypertension.   6. No pericardial effusion.   7. No prior echo is available for comparison.          ASSESSMENT/PLAN    48-year-old man with history of hypertension, atrial fibrillation/flutter, nonischemic cardiomyopathy with low normal EF, but still wearing a LifeVest for unclear reasons, atrial fibrillation/flutter with associated hypercoagulable state on long-term anticoagulation with Eliquis, 2 prior cardiac ablations (unclear details) at [facility], and now recurrence of dizziness/syncope transferred from [facility] with reports of atrial fibrillation with RVR associated with his symptoms for repeat ablation.  as per referring doctor other two ablations were aflutter.  never had afib.  We will attempt to get strips of arrhythmia that transferred him here.  He states he missed no eliquis - will unlikley need SHOAIB can use ICE during procedure.     Please keep NPO after midnight.  Long term unclear why he needs a lifevest and will discuss with primary cardiologist.  Also he notes a history of rheumatic heart disease - if this is true Coumadin is recommended rather than NOACs.
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
## 2023-10-10 LAB
ANION GAP SERPL CALC-SCNC: 10 MMOL/L — SIGNIFICANT CHANGE UP (ref 5–17)
BUN SERPL-MCNC: 30 MG/DL — HIGH (ref 7–23)
CALCIUM SERPL-MCNC: 9.4 MG/DL — SIGNIFICANT CHANGE UP (ref 8.4–10.5)
CHLORIDE SERPL-SCNC: 107 MMOL/L — SIGNIFICANT CHANGE UP (ref 96–108)
CO2 SERPL-SCNC: 20 MMOL/L — LOW (ref 22–31)
CREAT SERPL-MCNC: 1.2 MG/DL — SIGNIFICANT CHANGE UP (ref 0.5–1.3)
EGFR: 74 ML/MIN/1.73M2 — SIGNIFICANT CHANGE UP
GLUCOSE SERPL-MCNC: 102 MG/DL — HIGH (ref 70–99)
HCT VFR BLD CALC: 43.4 % — SIGNIFICANT CHANGE UP (ref 39–50)
HGB BLD-MCNC: 13.5 G/DL — SIGNIFICANT CHANGE UP (ref 13–17)
INR BLD: 0.91 — SIGNIFICANT CHANGE UP (ref 0.85–1.18)
ISTAT ACTK (ACTIVATED CLOTTING TIME KAOLIN): 293 SEC — HIGH (ref 74–137)
ISTAT ACTK (ACTIVATED CLOTTING TIME KAOLIN): 335 SEC — HIGH (ref 74–137)
ISTAT ACTK (ACTIVATED CLOTTING TIME KAOLIN): 377 SEC — HIGH (ref 74–137)
MAGNESIUM SERPL-MCNC: 2.2 MG/DL — SIGNIFICANT CHANGE UP (ref 1.6–2.6)
MCHC RBC-ENTMCNC: 31.1 GM/DL — LOW (ref 32–36)
MCHC RBC-ENTMCNC: 31.8 PG — SIGNIFICANT CHANGE UP (ref 27–34)
MCV RBC AUTO: 102.4 FL — HIGH (ref 80–100)
NRBC # BLD: 0 /100 WBCS — SIGNIFICANT CHANGE UP (ref 0–0)
PLATELET # BLD AUTO: 200 K/UL — SIGNIFICANT CHANGE UP (ref 150–400)
POTASSIUM SERPL-MCNC: 5.2 MMOL/L — SIGNIFICANT CHANGE UP (ref 3.5–5.3)
POTASSIUM SERPL-SCNC: 5.2 MMOL/L — SIGNIFICANT CHANGE UP (ref 3.5–5.3)
PROTHROM AB SERPL-ACNC: 10.4 SEC — SIGNIFICANT CHANGE UP (ref 9.5–13)
RBC # BLD: 4.24 M/UL — SIGNIFICANT CHANGE UP (ref 4.2–5.8)
RBC # FLD: 12.6 % — SIGNIFICANT CHANGE UP (ref 10.3–14.5)
SODIUM SERPL-SCNC: 137 MMOL/L — SIGNIFICANT CHANGE UP (ref 135–145)
WBC # BLD: 4.96 K/UL — SIGNIFICANT CHANGE UP (ref 3.8–10.5)
WBC # FLD AUTO: 4.96 K/UL — SIGNIFICANT CHANGE UP (ref 3.8–10.5)

## 2023-10-10 PROCEDURE — 93657 TX L/R ATRIAL FIB ADDL: CPT

## 2023-10-10 PROCEDURE — 99232 SBSQ HOSP IP/OBS MODERATE 35: CPT

## 2023-10-10 PROCEDURE — 93656 COMPRE EP EVAL ABLTJ ATR FIB: CPT

## 2023-10-10 PROCEDURE — 93655 ICAR CATH ABLTJ DSCRT ARRHYT: CPT

## 2023-10-10 PROCEDURE — 93623 PRGRMD STIMJ&PACG IV RX NFS: CPT | Mod: 26

## 2023-10-10 RX ORDER — ACETAMINOPHEN 500 MG
1000 TABLET ORAL ONCE
Refills: 0 | Status: COMPLETED | OUTPATIENT
Start: 2023-10-10 | End: 2023-10-10

## 2023-10-10 RX ORDER — ACETAMINOPHEN 500 MG
1000 TABLET ORAL ONCE
Refills: 0 | Status: COMPLETED | OUTPATIENT
Start: 2023-10-11 | End: 2023-10-11

## 2023-10-10 RX ADMIN — SACUBITRIL AND VALSARTAN 1 TABLET(S): 24; 26 TABLET, FILM COATED ORAL at 09:33

## 2023-10-10 RX ADMIN — Medication 400 MILLIGRAM(S): at 19:21

## 2023-10-10 RX ADMIN — LIDOCAINE 1 PATCH: 4 CREAM TOPICAL at 09:28

## 2023-10-10 RX ADMIN — SPIRONOLACTONE 25 MILLIGRAM(S): 25 TABLET, FILM COATED ORAL at 05:35

## 2023-10-10 RX ADMIN — Medication 50 MILLIGRAM(S): at 05:35

## 2023-10-10 RX ADMIN — APIXABAN 5 MILLIGRAM(S): 2.5 TABLET, FILM COATED ORAL at 09:33

## 2023-10-10 NOTE — PROGRESS NOTE ADULT - PROBLEM SELECTOR PLAN 5
h/o HLD   - home med: rosuvastatin 10mg qd  - C/w atorvastatin 40mg PO qd  -f/u lipid panel
Home med: rosuvastatin 10mg qd  - C/w atorvastatin 40mg PO qd  Cholesterol: 130 mg/dL  Triglycerides, Serum: 53 mg/dL  HDL Cholesterol: 65 mg/dL  LDL: 54
Home med: rosuvastatin 10mg qd  - C/w atorvastatin 40mg PO qd  Cholesterol: 130 mg/dL  Triglycerides, Serum: 53 mg/dL  HDL Cholesterol: 65 mg/dL  LDL: 54

## 2023-10-10 NOTE — PROVIDER CONTACT NOTE (OTHER) - ASSESSMENT
Alert and oriented. Pt states that he wants to take a shower or else he will remove his IV. Pt educated on high fall risk involved with taking a shower in the unit.

## 2023-10-10 NOTE — PROGRESS NOTE ADULT - PROBLEM SELECTOR PLAN 4
HTN, /77  -vitals q4h  -c/w home meds: Farxiga 10mg, Entresto 24/26mg PO BID, metoprolol 50mg qd HTN, /77  -vitals q6h because he refused q4h  -c/w home meds: Farxiga 10mg, Entresto 24/26mg PO BID, metoprolol 50mg qd

## 2023-10-10 NOTE — PROVIDER CONTACT NOTE (OTHER) - ACTION/TREATMENT ORDERED:
ILA Alfaro made aware. As per PA, pt found to already be in the shower by the time he was at bedside. Monitoring ongoing.

## 2023-10-10 NOTE — PROGRESS NOTE ADULT - SUBJECTIVE AND OBJECTIVE BOX
Interventional Cardiology PA Adult Progress Note    Subjective Assessment: Pt evaluated at beside this AM. Pt appears well and NAD. Denies CP, SOB, palpitation, n/v/d, fever, LOC, or headache.   	  MEDICATIONS:  metoprolol succinate ER 50 milliGRAM(s) Oral daily  sacubitril 24 mG/valsartan 26 mG 1 Tablet(s) Oral two times a day  spironolactone 25 milliGRAM(s) Oral daily  atorvastatin 40 milliGRAM(s) Oral at bedtime  apixaban 5 milliGRAM(s) Oral two times a day  influenza   Vaccine 0.5 milliLiter(s) IntraMuscular once  lidocaine   4% Patch 1 Patch Transdermal every 24 hours      	    [PHYSICAL EXAM:  TELEMETRY:  T(C): 36.7 (10-10-23 @ 08:30), Max: 36.8 (10-10-23 @ 05:32)  HR: 73 (10-10-23 @ 08:30) (72 - 79)  BP: 102/74 (10-10-23 @ 08:30) (94/54 - 110/64)  RR: 18 (10-10-23 @ 08:30) (18 - 18)  SpO2: 96% (10-10-23 @ 08:30) (96% - 96%)  Wt(kg): --  I&O's Summary    09 Oct 2023 07:01  -  10 Oct 2023 07:00  --------------------------------------------------------  IN: 430 mL / OUT: 800 mL / NET: -370 mL        Sandoval:  Central/PICC/Mid Line:                                         Appearance: Normal	  HEENT:   Normal oral mucosa, PERRL, EOMI	  Neck: Supple, - JVD; Carotid Bruit   Cardiovascular: Normal S1 S2, No JVD, No murmurs,   Respiratory: Lungs clear to auscultation. No Rales, Rhonchi, Wheezing	  Gastrointestinal:  Soft, Non-tender, + BS	  Skin: No rashes, No ecchymoses, No cyanosis  Extremities: Normal range of motion, No clubbing, cyanosis or edema  Vascular: Peripheral pulses palpable 2+ bilaterally  Neurologic: Non-focal  Psychiatry: A & O x 3, Mood & affect appropriate                                13.5   4.96  )-----------( 200      ( 10 Oct 2023 05:30 )             43.4     10-10    137  |  107  |  30<H>  ----------------------------<  102<H>  5.2   |  20<L>  |  1.20    Ca    9.4      10 Oct 2023 05:30  Mg     2.2     10-10      proBNP:   Lipid Profile:   HgA1c:   TSH:   PT/INR - ( 10 Oct 2023 05:30 )   PT: 10.4 sec;   INR: 0.91
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
## 2023-10-10 NOTE — PROGRESS NOTE ADULT - PROBLEM SELECTOR PLAN 1
By report, presentation consistent with AFib probably with RVR.  - Currently in NSR    -Echo 10/7: NSR @ 66 BPM. w/ LVH  Thyroid Panel Normal   -Trop neg x 2 from [facility]  -TTE 10/7: LVEF 50% with global hypokinesis, Grade I LV DD, RV size and function normal, No significant valvular disease, No pericardial effusion.  -given [id number] of at least 2 for HTN and CHF, if there was any interruption in his a/c he will need his ROSITA cleared prior to ablation (could be done with SHOAIB, cardiac CTA, or ICE imaging at the time of the procedure); overall likely low risk given sinus rhythm and no reported interruption from the patient's standpoint  -please obtain prior ablation records from [facility]  -continue metoprolol succinate 50mg daily  -tentative plan for ablation Tuesday with Dr. Major; please keep NPO Sunday night after midnight  -c/w Eliquis 5mg PO BID and metoprolol 50mg po qd  -keep on telemetry By report, presentation consistent with AFib probably with RVR.  - Currently in NSR    -Echo 10/7: NSR @ 66 BPM. w/ LVH  - Thyroid Panel Normal   -TTE 10/7: LVEF 50% with global hypokinesis, Grade I LV DD, RV size and function normal, No significant valvular disease, No pericardial effusion.  -given [id number] of at least 2 for HTN and CHF, if there was any interruption in his a/c he will need his ROSITA cleared prior to ablation (could be done with SHOAIB, cardiac CTA, or ICE imaging at the time of the procedure); overall likely low risk given sinus rhythm and no reported interruption from the patient's standpoint  -c/w metoprolol succinate 50mg daily  -S/p 10/10/23 ICE and ablation  -c/w Eliquis 5mg PO BID and metoprolol 50mg po qd  -keep on telemetry By report, presentation consistent with AFib probably with RVR.  - Currently in NSR    -Echo 10/7: NSR @ 66 BPM. w/ LVH  - Thyroid Panel Normal   -TTE 10/7: LVEF 50% with global hypokinesis, Grade I LV DD, RV size and function normal, No significant valvular disease, No pericardial effusion.  -given [id number] of at least 2 for HTN and CHF, if there was any interruption in his a/c he will need his ROSITA cleared prior to ablation (could be done with SHOAIB, cardiac CTA, or ICE imaging at the time of the procedure); overall likely low risk given sinus rhythm and no reported interruption from the patient's standpoint  -c/w metoprolol succinate 50mg daily  -S/p ICE 10/10/23: ICE no clot present   -S/p ablation 10/10/23: Successful  -c/w Eliquis 5mg PO BID and metoprolol 50mg po qd  -keep on telemetry

## 2023-10-10 NOTE — PROGRESS NOTE ADULT - PROBLEM SELECTOR PLAN 2
[facility]/o of EF as low as 20% now recovered to 50%.   -CE neg x 2 at [facility]  -EKG as above  -CXR neg at [facility]  -c/w home meds: Farxiga 10mg, entresto 24/26mg PO BID, metoprolol 50mg qd, started Spironolactone 25   -Daily weights, 1L PO restriction  -if no history of VT and EF >35% on echo, LifeVest does not clearly offer benefit; if he meets indications for primary prevention ICD, this could be considered. Pt euvolemic on exam. Satting well on RA  -EKG as above  -CXR neg at [facility]  -c/w home meds: Farxiga 10mg, entresto 24/26mg PO BID, metoprolol 50mg qd, started Spironolactone 25   -Daily weights, 1L PO restriction  -if no history of VT and EF >35% on echo, LifeVest does not clearly offer benefit; if he meets indications for primary prevention ICD, this could be considered.

## 2023-10-10 NOTE — PROGRESS NOTE ADULT - PROBLEM SELECTOR PLAN 6
Lidocaine patch prn for back pain    F: None  E: Replete if K<4 or Mag<2  N: DASH Diet  GIppx:   VTEppx: ELiquis 5mg BID  Dispo: [facility] 10/9 Lidocaine patch prn for back pain    F: None  E: Replete if K<4 or Mag<2  N: DASH Diet  VTEppx: ELiquis 5mg BID  Dispo: [facility] 10/10

## 2023-10-10 NOTE — PROGRESS NOTE ADULT - PROBLEM SELECTOR PROBLEM 2
Heart failure with mildly reduced ejection fraction (HFmrEF)
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
## 2023-10-11 ENCOUNTER — TRANSCRIPTION ENCOUNTER (OUTPATIENT)
Age: 48
End: 2023-10-11

## 2023-10-11 VITALS
TEMPERATURE: 98 F | DIASTOLIC BLOOD PRESSURE: 74 MMHG | SYSTOLIC BLOOD PRESSURE: 118 MMHG | OXYGEN SATURATION: 97 % | RESPIRATION RATE: 18 BRPM | HEART RATE: 90 BPM

## 2023-10-11 PROBLEM — Z00.00 ENCOUNTER FOR PREVENTIVE HEALTH EXAMINATION: Status: ACTIVE | Noted: 2023-10-11

## 2023-10-11 PROBLEM — Z86.79 PERSONAL HISTORY OF OTHER DISEASES OF THE CIRCULATORY SYSTEM: Chronic | Status: ACTIVE | Noted: 2023-10-07

## 2023-10-11 PROBLEM — M19.90 UNSPECIFIED OSTEOARTHRITIS, UNSPECIFIED SITE: Chronic | Status: ACTIVE | Noted: 2023-10-07

## 2023-10-11 PROBLEM — E78.5 HYPERLIPIDEMIA, UNSPECIFIED: Chronic | Status: ACTIVE | Noted: 2023-10-07

## 2023-10-11 PROBLEM — I50.22 CHRONIC SYSTOLIC (CONGESTIVE) HEART FAILURE: Chronic | Status: ACTIVE | Noted: 2023-10-07

## 2023-10-11 PROBLEM — I10 ESSENTIAL (PRIMARY) HYPERTENSION: Chronic | Status: ACTIVE | Noted: 2023-10-07

## 2023-10-11 LAB
ANION GAP SERPL CALC-SCNC: 11 MMOL/L — SIGNIFICANT CHANGE UP (ref 5–17)
BUN SERPL-MCNC: 26 MG/DL — HIGH (ref 7–23)
CALCIUM SERPL-MCNC: 9.3 MG/DL — SIGNIFICANT CHANGE UP (ref 8.4–10.5)
CHLORIDE SERPL-SCNC: 105 MMOL/L — SIGNIFICANT CHANGE UP (ref 96–108)
CO2 SERPL-SCNC: 18 MMOL/L — LOW (ref 22–31)
CREAT SERPL-MCNC: 1.38 MG/DL — HIGH (ref 0.5–1.3)
EGFR: 62 ML/MIN/1.73M2 — SIGNIFICANT CHANGE UP
GLUCOSE SERPL-MCNC: 133 MG/DL — HIGH (ref 70–99)
HCT VFR BLD CALC: 40.4 % — SIGNIFICANT CHANGE UP (ref 39–50)
HGB BLD-MCNC: 13.3 G/DL — SIGNIFICANT CHANGE UP (ref 13–17)
MAGNESIUM SERPL-MCNC: 1.9 MG/DL — SIGNIFICANT CHANGE UP (ref 1.6–2.6)
MCHC RBC-ENTMCNC: 32.2 PG — SIGNIFICANT CHANGE UP (ref 27–34)
MCHC RBC-ENTMCNC: 32.9 GM/DL — SIGNIFICANT CHANGE UP (ref 32–36)
MCV RBC AUTO: 97.8 FL — SIGNIFICANT CHANGE UP (ref 80–100)
NRBC # BLD: 0 /100 WBCS — SIGNIFICANT CHANGE UP (ref 0–0)
PLATELET # BLD AUTO: 227 K/UL — SIGNIFICANT CHANGE UP (ref 150–400)
POTASSIUM SERPL-MCNC: 4.7 MMOL/L — SIGNIFICANT CHANGE UP (ref 3.5–5.3)
POTASSIUM SERPL-SCNC: 4.7 MMOL/L — SIGNIFICANT CHANGE UP (ref 3.5–5.3)
RBC # BLD: 4.13 M/UL — LOW (ref 4.2–5.8)
RBC # FLD: 12.5 % — SIGNIFICANT CHANGE UP (ref 10.3–14.5)
SODIUM SERPL-SCNC: 134 MMOL/L — LOW (ref 135–145)
WBC # BLD: 7.6 K/UL — SIGNIFICANT CHANGE UP (ref 3.8–10.5)
WBC # FLD AUTO: 7.6 K/UL — SIGNIFICANT CHANGE UP (ref 3.8–10.5)

## 2023-10-11 PROCEDURE — 93005 ELECTROCARDIOGRAM TRACING: CPT

## 2023-10-11 PROCEDURE — C1759: CPT

## 2023-10-11 PROCEDURE — 85610 PROTHROMBIN TIME: CPT

## 2023-10-11 PROCEDURE — 86901 BLOOD TYPING SEROLOGIC RH(D): CPT

## 2023-10-11 PROCEDURE — 80053 COMPREHEN METABOLIC PANEL: CPT

## 2023-10-11 PROCEDURE — 93306 TTE W/DOPPLER COMPLETE: CPT

## 2023-10-11 PROCEDURE — 36415 COLL VENOUS BLD VENIPUNCTURE: CPT

## 2023-10-11 PROCEDURE — 84439 ASSAY OF FREE THYROXINE: CPT

## 2023-10-11 PROCEDURE — 80061 LIPID PANEL: CPT

## 2023-10-11 PROCEDURE — C1732: CPT

## 2023-10-11 PROCEDURE — 85027 COMPLETE CBC AUTOMATED: CPT

## 2023-10-11 PROCEDURE — C1894: CPT

## 2023-10-11 PROCEDURE — 86900 BLOOD TYPING SEROLOGIC ABO: CPT

## 2023-10-11 PROCEDURE — 99239 HOSP IP/OBS DSCHRG MGMT >30: CPT

## 2023-10-11 PROCEDURE — C1760: CPT

## 2023-10-11 PROCEDURE — 80076 HEPATIC FUNCTION PANEL: CPT

## 2023-10-11 PROCEDURE — 86850 RBC ANTIBODY SCREEN: CPT

## 2023-10-11 PROCEDURE — 80048 BASIC METABOLIC PNL TOTAL CA: CPT

## 2023-10-11 PROCEDURE — C1766: CPT

## 2023-10-11 PROCEDURE — 99232 SBSQ HOSP IP/OBS MODERATE 35: CPT

## 2023-10-11 PROCEDURE — 83036 HEMOGLOBIN GLYCOSYLATED A1C: CPT

## 2023-10-11 PROCEDURE — 84443 ASSAY THYROID STIM HORMONE: CPT

## 2023-10-11 PROCEDURE — 93970 EXTREMITY STUDY: CPT

## 2023-10-11 PROCEDURE — 83735 ASSAY OF MAGNESIUM: CPT

## 2023-10-11 PROCEDURE — 85730 THROMBOPLASTIN TIME PARTIAL: CPT

## 2023-10-11 PROCEDURE — 85347 COAGULATION TIME ACTIVATED: CPT

## 2023-10-11 RX ORDER — SACUBITRIL AND VALSARTAN 24; 26 MG/1; MG/1
1 TABLET, FILM COATED ORAL
Refills: 0 | DISCHARGE

## 2023-10-11 RX ORDER — ACETAMINOPHEN 500 MG
1000 TABLET ORAL ONCE
Refills: 0 | Status: COMPLETED | OUTPATIENT
Start: 2023-10-11 | End: 2023-10-11

## 2023-10-11 RX ORDER — ROSUVASTATIN CALCIUM 5 MG/1
1 TABLET ORAL
Qty: 30 | Refills: 1
Start: 2023-10-11 | End: 2023-12-09

## 2023-10-11 RX ORDER — METOPROLOL TARTRATE 50 MG
1 TABLET ORAL
Refills: 0 | DISCHARGE

## 2023-10-11 RX ORDER — APIXABAN 2.5 MG/1
1 TABLET, FILM COATED ORAL
Refills: 0 | DISCHARGE

## 2023-10-11 RX ORDER — LIDOCAINE 4 G/100G
1 CREAM TOPICAL
Qty: 5 | Refills: 1
Start: 2023-10-11 | End: 2023-12-09

## 2023-10-11 RX ORDER — APIXABAN 2.5 MG/1
1 TABLET, FILM COATED ORAL
Qty: 60 | Refills: 1
Start: 2023-10-11 | End: 2023-12-09

## 2023-10-11 RX ORDER — ROSUVASTATIN CALCIUM 5 MG/1
1 TABLET ORAL
Refills: 0 | DISCHARGE

## 2023-10-11 RX ORDER — DAPAGLIFLOZIN 10 MG/1
1 TABLET, FILM COATED ORAL
Qty: 30 | Refills: 1
Start: 2023-10-11 | End: 2023-12-09

## 2023-10-11 RX ORDER — METOPROLOL TARTRATE 50 MG
1 TABLET ORAL
Qty: 30 | Refills: 1
Start: 2023-10-11 | End: 2023-12-09

## 2023-10-11 RX ORDER — SPIRONOLACTONE 25 MG/1
1 TABLET, FILM COATED ORAL
Qty: 30 | Refills: 1
Start: 2023-10-11 | End: 2023-12-09

## 2023-10-11 RX ORDER — MAGNESIUM OXIDE 400 MG ORAL TABLET 241.3 MG
400 TABLET ORAL ONCE
Refills: 0 | Status: COMPLETED | OUTPATIENT
Start: 2023-10-11 | End: 2023-10-11

## 2023-10-11 RX ORDER — DAPAGLIFLOZIN 10 MG/1
1 TABLET, FILM COATED ORAL
Refills: 0 | DISCHARGE

## 2023-10-11 RX ORDER — SACUBITRIL AND VALSARTAN 24; 26 MG/1; MG/1
1 TABLET, FILM COATED ORAL
Qty: 60 | Refills: 1
Start: 2023-10-11 | End: 2023-12-09

## 2023-10-11 RX ADMIN — SACUBITRIL AND VALSARTAN 1 TABLET(S): 24; 26 TABLET, FILM COATED ORAL at 09:11

## 2023-10-11 RX ADMIN — Medication 400 MILLIGRAM(S): at 05:48

## 2023-10-11 RX ADMIN — MAGNESIUM OXIDE 400 MG ORAL TABLET 400 MILLIGRAM(S): 241.3 TABLET ORAL at 07:30

## 2023-10-11 RX ADMIN — Medication 50 MILLIGRAM(S): at 05:58

## 2023-10-11 RX ADMIN — LIDOCAINE 1 PATCH: 4 CREAM TOPICAL at 09:08

## 2023-10-11 RX ADMIN — Medication 1000 MILLIGRAM(S): at 09:08

## 2023-10-11 RX ADMIN — SPIRONOLACTONE 25 MILLIGRAM(S): 25 TABLET, FILM COATED ORAL at 05:58

## 2023-10-11 RX ADMIN — APIXABAN 5 MILLIGRAM(S): 2.5 TABLET, FILM COATED ORAL at 09:11

## 2023-10-11 RX ADMIN — Medication 400 MILLIGRAM(S): at 00:12

## 2023-10-11 NOTE — DISCHARGE NOTE PROVIDER - PROVIDER TOKENS
PROVIDER:[TOKEN:[[id number]]] PROVIDER:[TOKEN:[[id number]],SCHEDULEDAPPT:[11/15/2023],SCHEDULEDAPPTTIME:[09:15 AM]]

## 2023-10-11 NOTE — DISCHARGE NOTE PROVIDER - NSDCCPCAREPLAN_GEN_ALL_CORE_FT
PRINCIPAL DISCHARGE DIAGNOSIS  Diagnosis: Afib  Assessment and Plan of Treatment: You have an abnormal heart rhythm (arrhythmia) called atrial fibrillation. With this condition, the hearts 2 upper chambers (the atria) quiver rather than squeeze the blood out in a normal pattern. This leads to an irregular and sometimes rapid heartbeat. Atrial fibrillation is serious condition as it affects the heart’s ability to fill with blood as it should and blood clots may form, which increases the risk for stroke.  -Please CONTINUE  ELIQUIS 5MG TWICE A DAY to prevent a stroke.  -Please CONTINUE METOPROLOL 50 TWICE A DAY to keep your heart rate regular  -Please follow up with Dr. Major.      SECONDARY DISCHARGE DIAGNOSES  Diagnosis: Heart failure with mildly reduced ejection fraction (HFmrEF)  Assessment and Plan of Treatment: You have a weak heart, also known as Congestive Heart Failure (CHF). Heart failure is a condition in which the heart does not pump or fill with blood well. As a result, the heart lags behind in its job of moving blood throughout the body. This can lead to symptoms such as swelling, trouble breathing, and feeling tired. Your Ejection Fraction (EF) is 50%, a normal EF is 55-60%.  -Please continue  Entresto 24-26mg 2 times a day, Farxiga 10mg daily to prevent fluid build up in the body.  -Please weigh yourself daily, for any significant increases in daily weight of 2lbs/day or 5lbs/week with associated swelling in the legs or abdomen and/or shortness of breath, please call your doctor or go to the emergency room.    Diagnosis: HLD (hyperlipidemia)  Assessment and Plan of Treatment: Please continue Rosuvastatin 10mg at bedtime to keep your cholesterol low. High cholesterol contributes to heart disease.    Diagnosis: HTN (hypertension)  Assessment and Plan of Treatment: Please continue  Entresto 24-26mg 2 times a day, Farxiga 10mg once a day as listed to keep your blood pressure controlled. For blood pressure that is too high or too low please see your doctor or go to the emergency room as necessary.     PRINCIPAL DISCHARGE DIAGNOSIS  Diagnosis: Afib  Assessment and Plan of Treatment: You have an abnormal heart rhythm (arrhythmia) called atrial fibrillation. With this condition, the hearts 2 upper chambers (the atria) quiver rather than squeeze the blood out in a normal pattern. This leads to an irregular and sometimes rapid heartbeat. Atrial fibrillation is serious condition as it affects the heart’s ability to fill with blood as it should and blood clots may form, which increases the risk for stroke.  -Please CONTINUE  ELIQUIS 5MG TWICE A DAY to prevent a stroke.  -Please CONTINUE METOPROLOL 50 ONCE A DAY to keep your heart rate regular  -Please follow up with Dr. Major.      SECONDARY DISCHARGE DIAGNOSES  Diagnosis: Heart failure with mildly reduced ejection fraction (HFmrEF)  Assessment and Plan of Treatment: You have a weak heart, also known as Congestive Heart Failure (CHF). Heart failure is a condition in which the heart does not pump or fill with blood well. As a result, the heart lags behind in its job of moving blood throughout the body. This can lead to symptoms such as swelling, trouble breathing, and feeling tired. Your Ejection Fraction (EF) is 50%, a normal EF is 55-60%.  -Please continue  Entresto 24-26mg 2 times a day, Farxiga 10mg daily to prevent fluid build up in the body.  - Please START TAKING SPIRONOLACTONE 25MD DAILY.  -Please weigh yourself daily, for any significant increases in daily weight of 2lbs/day or 5lbs/week with associated swelling in the legs or abdomen and/or shortness of breath, please call your doctor or go to the emergency room.    Diagnosis: HLD (hyperlipidemia)  Assessment and Plan of Treatment: Please continue Rosuvastatin 10mg at bedtime to keep your cholesterol low. High cholesterol contributes to heart disease.    Diagnosis: HTN (hypertension)  Assessment and Plan of Treatment: Please continue  Entresto 24-26mg 2 times a day, Farxiga 10mg once a day as listed to keep your blood pressure controlled. For blood pressure that is too high or too low please see your doctor or go to the emergency room as necessary.
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
## 2023-10-11 NOTE — DISCHARGE NOTE PROVIDER - CARE PROVIDER_API CALL
Carlos Major  Cardiac Electrophysiology  [address], [address] [place] [address]  Phone: [phone]  Fax: [phone]  Follow Up Time:    Carlos Major  Cardiac Electrophysiology  [address], [address] [place] [address]  Phone: [phone]  Fax: [phone]  Scheduled Appointment: 11/15/2023 09:15 AM

## 2023-10-11 NOTE — DISCHARGE NOTE PROVIDER - NSDCMRMEDTOKEN_GEN_ALL_CORE_FT
Eliquis 5 mg oral tablet: 1 tab(s) orally 2 times a day  Entresto 24 mg-26 mg oral tablet: 1 tab(s) orally 2 times a day  Farxiga 10 mg oral tablet: 1 tab(s) orally once a day  metoprolol succinate 50 mg oral capsule, extended release: 1 cap(s) orally 2 times a day  rosuvastatin 10 mg oral capsule: 1 cap(s) orally once a day   Aldactone 25 mg oral tablet: 1 tab(s) orally once a day  Alocane Emergency Burn Pads Maximum Strength 4% topical film: Apply topically to affected area once a day  Eliquis 5 mg oral tablet: 1 tab(s) orally 2 times a day  Entresto 24 mg-26 mg oral tablet: 1 tab(s) orally 2 times a day  Farxiga 10 mg oral tablet: 1 tab(s) orally once a day  metoprolol succinate 50 mg oral capsule, extended release: 1 cap(s) orally once a day  rosuvastatin 10 mg oral capsule: 1 cap(s) orally once a day

## 2023-10-11 NOTE — DISCHARGE NOTE NURSING/CASE MANAGEMENT/SOCIAL WORK - NSDCPEFALRISK_GEN_ALL_CORE
For information on Fall & Injury Prevention, visit: [email or web address] OR  [email or web address] OR  https://www.cdc.gov/steadi/patient.html
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
## 2023-10-11 NOTE — DISCHARGE NOTE PROVIDER - NSDCFUSCHEDAPPT_GEN_ALL_CORE_FT
Carlos Major  [facility] Physician [facility]  [facility] [address]  Scheduled Appointment: 11/15/2023
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
## 2023-10-20 DIAGNOSIS — Z79.01 LONG TERM (CURRENT) USE OF ANTICOAGULANTS: ICD-10-CM

## 2023-10-20 DIAGNOSIS — Z82.49 FAMILY HISTORY OF ISCHEMIC HEART DISEASE AND OTHER DISEASES OF THE CIRCULATORY SYSTEM: ICD-10-CM

## 2023-10-20 DIAGNOSIS — M47.9 SPONDYLOSIS, UNSPECIFIED: ICD-10-CM

## 2023-10-20 DIAGNOSIS — Z79.899 OTHER LONG TERM (CURRENT) DRUG THERAPY: ICD-10-CM

## 2023-10-20 DIAGNOSIS — I09.9 RHEUMATIC HEART DISEASE, UNSPECIFIED: ICD-10-CM

## 2023-10-20 DIAGNOSIS — I48.91 UNSPECIFIED ATRIAL FIBRILLATION: ICD-10-CM

## 2023-10-20 DIAGNOSIS — I48.0 PAROXYSMAL ATRIAL FIBRILLATION: ICD-10-CM

## 2023-10-20 DIAGNOSIS — I11.0 HYPERTENSIVE HEART DISEASE WITH HEART FAILURE: ICD-10-CM

## 2023-10-20 DIAGNOSIS — I50.22 CHRONIC SYSTOLIC (CONGESTIVE) HEART FAILURE: ICD-10-CM

## 2023-10-20 DIAGNOSIS — E78.5 HYPERLIPIDEMIA, UNSPECIFIED: ICD-10-CM

## 2023-11-15 ENCOUNTER — APPOINTMENT (OUTPATIENT)
Dept: HEART AND VASCULAR | Facility: CLINIC | Age: 48
End: 2023-11-15

## 2024-05-13 ENCOUNTER — NON-APPOINTMENT (OUTPATIENT)
Age: 49
End: 2024-05-13

## 2024-08-20 ENCOUNTER — INPATIENT (INPATIENT)
Facility: HOSPITAL | Age: 49
LOS: 4 days | Discharge: HOME | End: 2024-08-25
Attending: INTERNAL MEDICINE | Admitting: INTERNAL MEDICINE
Payer: COMMERCIAL

## 2024-08-20 VITALS
TEMPERATURE: 99 F | HEART RATE: 93 BPM | DIASTOLIC BLOOD PRESSURE: 71 MMHG | RESPIRATION RATE: 18 BRPM | OXYGEN SATURATION: 97 % | WEIGHT: 139.99 LBS | SYSTOLIC BLOOD PRESSURE: 108 MMHG

## 2024-08-20 DIAGNOSIS — I48.92 UNSPECIFIED ATRIAL FLUTTER: ICD-10-CM

## 2024-08-20 DIAGNOSIS — Z98.890 OTHER SPECIFIED POSTPROCEDURAL STATES: Chronic | ICD-10-CM

## 2024-08-20 DIAGNOSIS — I50.22 CHRONIC SYSTOLIC (CONGESTIVE) HEART FAILURE: ICD-10-CM

## 2024-08-20 DIAGNOSIS — D64.9 ANEMIA, UNSPECIFIED: ICD-10-CM

## 2024-08-20 DIAGNOSIS — S21.339A PUNCTURE WOUND WITHOUT FOREIGN BODY OF UNSPECIFIED FRONT WALL OF THORAX WITH PENETRATION INTO THORACIC CAVITY, INITIAL ENCOUNTER: Chronic | ICD-10-CM

## 2024-08-20 DIAGNOSIS — E78.5 HYPERLIPIDEMIA, UNSPECIFIED: ICD-10-CM

## 2024-08-20 DIAGNOSIS — R55 SYNCOPE AND COLLAPSE: ICD-10-CM

## 2024-08-20 LAB
ALBUMIN SERPL ELPH-MCNC: 3.5 G/DL — SIGNIFICANT CHANGE UP (ref 3.3–5)
ALP SERPL-CCNC: 108 U/L — SIGNIFICANT CHANGE UP (ref 40–120)
ALT FLD-CCNC: 5 U/L — LOW (ref 10–45)
ANION GAP SERPL CALC-SCNC: 9 MMOL/L — SIGNIFICANT CHANGE UP (ref 5–17)
APPEARANCE UR: ABNORMAL
APTT BLD: 33.8 SEC — SIGNIFICANT CHANGE UP (ref 24.5–35.6)
AST SERPL-CCNC: 11 U/L — SIGNIFICANT CHANGE UP (ref 10–40)
BACTERIA # UR AUTO: NEGATIVE /HPF — SIGNIFICANT CHANGE UP
BASOPHILS # BLD AUTO: 0.02 K/UL — SIGNIFICANT CHANGE UP (ref 0–0.2)
BASOPHILS NFR BLD AUTO: 0.4 % — SIGNIFICANT CHANGE UP (ref 0–2)
BILIRUB SERPL-MCNC: 0.5 MG/DL — SIGNIFICANT CHANGE UP (ref 0.2–1.2)
BILIRUB UR-MCNC: NEGATIVE — SIGNIFICANT CHANGE UP
BUN SERPL-MCNC: 10 MG/DL — SIGNIFICANT CHANGE UP (ref 7–23)
CALCIUM SERPL-MCNC: 8.9 MG/DL — SIGNIFICANT CHANGE UP (ref 8.4–10.5)
CAST: 0 /LPF — SIGNIFICANT CHANGE UP (ref 0–4)
CHLORIDE SERPL-SCNC: 106 MMOL/L — SIGNIFICANT CHANGE UP (ref 96–108)
CO2 SERPL-SCNC: 22 MMOL/L — SIGNIFICANT CHANGE UP (ref 22–31)
COLOR SPEC: YELLOW — SIGNIFICANT CHANGE UP
CREAT SERPL-MCNC: 1.19 MG/DL — SIGNIFICANT CHANGE UP (ref 0.5–1.3)
DIFF PNL FLD: NEGATIVE — SIGNIFICANT CHANGE UP
EGFR: 74 ML/MIN/1.73M2 — SIGNIFICANT CHANGE UP
EOSINOPHIL # BLD AUTO: 0.06 K/UL — SIGNIFICANT CHANGE UP (ref 0–0.5)
EOSINOPHIL NFR BLD AUTO: 1.3 % — SIGNIFICANT CHANGE UP (ref 0–6)
GLUCOSE SERPL-MCNC: 87 MG/DL — SIGNIFICANT CHANGE UP (ref 70–99)
GLUCOSE UR QL: >=1000 MG/DL
HCT VFR BLD CALC: 30.8 % — LOW (ref 39–50)
HGB BLD-MCNC: 9.4 G/DL — LOW (ref 13–17)
IMM GRANULOCYTES NFR BLD AUTO: 0.2 % — SIGNIFICANT CHANGE UP (ref 0–0.9)
INR BLD: 1.03 — SIGNIFICANT CHANGE UP (ref 0.85–1.18)
KETONES UR-MCNC: ABNORMAL MG/DL
LEUKOCYTE ESTERASE UR-ACNC: NEGATIVE — SIGNIFICANT CHANGE UP
LYMPHOCYTES # BLD AUTO: 1.26 K/UL — SIGNIFICANT CHANGE UP (ref 1–3.3)
LYMPHOCYTES # BLD AUTO: 26.3 % — SIGNIFICANT CHANGE UP (ref 13–44)
MCHC RBC-ENTMCNC: 26.3 PG — LOW (ref 27–34)
MCHC RBC-ENTMCNC: 30.5 GM/DL — LOW (ref 32–36)
MCV RBC AUTO: 86.3 FL — SIGNIFICANT CHANGE UP (ref 80–100)
MONOCYTES # BLD AUTO: 0.57 K/UL — SIGNIFICANT CHANGE UP (ref 0–0.9)
MONOCYTES NFR BLD AUTO: 11.9 % — SIGNIFICANT CHANGE UP (ref 2–14)
MUCOUS THREADS # UR AUTO: PRESENT
NEUTROPHILS # BLD AUTO: 2.88 K/UL — SIGNIFICANT CHANGE UP (ref 1.8–7.4)
NEUTROPHILS NFR BLD AUTO: 59.9 % — SIGNIFICANT CHANGE UP (ref 43–77)
NITRITE UR-MCNC: NEGATIVE — SIGNIFICANT CHANGE UP
NRBC # BLD: 0 /100 WBCS — SIGNIFICANT CHANGE UP (ref 0–0)
NT-PROBNP SERPL-SCNC: <36 PG/ML — SIGNIFICANT CHANGE UP (ref 0–300)
PH UR: 7.5 — SIGNIFICANT CHANGE UP (ref 5–8)
PLATELET # BLD AUTO: 244 K/UL — SIGNIFICANT CHANGE UP (ref 150–400)
POTASSIUM SERPL-MCNC: 4.3 MMOL/L — SIGNIFICANT CHANGE UP (ref 3.5–5.3)
POTASSIUM SERPL-SCNC: 4.3 MMOL/L — SIGNIFICANT CHANGE UP (ref 3.5–5.3)
PROT SERPL-MCNC: 6.9 G/DL — SIGNIFICANT CHANGE UP (ref 6–8.3)
PROT UR-MCNC: 30 MG/DL
PROTHROM AB SERPL-ACNC: 11.7 SEC — SIGNIFICANT CHANGE UP (ref 9.5–13)
RAPID RVP RESULT: DETECTED
RBC # BLD: 3.57 M/UL — LOW (ref 4.2–5.8)
RBC # FLD: 15.6 % — HIGH (ref 10.3–14.5)
RBC CASTS # UR COMP ASSIST: 0 /HPF — SIGNIFICANT CHANGE UP (ref 0–4)
SARS-COV-2 RNA SPEC QL NAA+PROBE: DETECTED
SODIUM SERPL-SCNC: 137 MMOL/L — SIGNIFICANT CHANGE UP (ref 135–145)
SP GR SPEC: >1.03 — HIGH (ref 1–1.03)
SQUAMOUS # UR AUTO: 0 /HPF — SIGNIFICANT CHANGE UP (ref 0–5)
TROPONIN T, HIGH SENSITIVITY RESULT: <6 NG/L — SIGNIFICANT CHANGE UP (ref 0–51)
UROBILINOGEN FLD QL: 2 MG/DL (ref 0.2–1)
WBC # BLD: 4.8 K/UL — SIGNIFICANT CHANGE UP (ref 3.8–10.5)
WBC # FLD AUTO: 4.8 K/UL — SIGNIFICANT CHANGE UP (ref 3.8–10.5)
WBC UR QL: 0 /HPF — SIGNIFICANT CHANGE UP (ref 0–5)

## 2024-08-20 PROCEDURE — 99223 1ST HOSP IP/OBS HIGH 75: CPT

## 2024-08-20 PROCEDURE — 70450 CT HEAD/BRAIN W/O DYE: CPT | Mod: 26,MC

## 2024-08-20 PROCEDURE — 99285 EMERGENCY DEPT VISIT HI MDM: CPT | Mod: 25

## 2024-08-20 PROCEDURE — 93308 TTE F-UP OR LMTD: CPT | Mod: 26

## 2024-08-20 PROCEDURE — 71045 X-RAY EXAM CHEST 1 VIEW: CPT | Mod: 26

## 2024-08-20 PROCEDURE — 93010 ELECTROCARDIOGRAM REPORT: CPT

## 2024-08-20 RX ORDER — ACETAMINOPHEN 325 MG/1
1000 TABLET ORAL ONCE
Refills: 0 | Status: COMPLETED | OUTPATIENT
Start: 2024-08-20 | End: 2024-08-20

## 2024-08-20 RX ORDER — ROSUVASTATIN CALCIUM 10 MG/1
10 TABLET ORAL AT BEDTIME
Refills: 0 | Status: DISCONTINUED | OUTPATIENT
Start: 2024-08-20 | End: 2024-08-25

## 2024-08-20 RX ORDER — LIDOCAINE/BENZALKONIUM/ALCOHOL
1 SOLUTION, NON-ORAL TOPICAL DAILY
Refills: 0 | Status: DISCONTINUED | OUTPATIENT
Start: 2024-08-20 | End: 2024-08-25

## 2024-08-20 RX ADMIN — Medication 3 MILLIGRAM(S): at 21:28

## 2024-08-20 RX ADMIN — ROSUVASTATIN CALCIUM 10 MILLIGRAM(S): 10 TABLET ORAL at 21:28

## 2024-08-20 RX ADMIN — ACETAMINOPHEN 400 MILLIGRAM(S): 325 TABLET ORAL at 21:29

## 2024-08-20 RX ADMIN — ACETAMINOPHEN 1000 MILLIGRAM(S): 325 TABLET ORAL at 22:29
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
## 2024-08-20 NOTE — ED ADULT NURSE NOTE - NSFALLUNIVINTERV_ED_ALL_ED
Bed/Stretcher in lowest position, wheels locked, appropriate side rails in place/Call bell, personal items and telephone in reach/Instruct patient to call for assistance before getting out of bed/chair/stretcher/Non-slip footwear applied when patient is off stretcher/[place] to call system/Physically safe environment - no spills, clutter or unnecessary equipment/Purposeful proactive rounding/Room/bathroom lighting operational, light cord in reach
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
## 2024-08-20 NOTE — H&P ADULT - PROBLEM SELECTOR PLAN 2
- Currently NSR with HR 80s.   - EP Hx: two prior [facility]ations [facility]; Most recently s/p successful [facility] AFib ablation on 10/10/2023 via groin access.   - AC: Chadsvasc2. Labs reveal new Anemia of Hgb 9.4 Hct 30.8 (Oct 2023 baseline 13-14/40-43).  F/up FOBT.  If negative, resume Eliquis 5mg PO BID  - Rate: currently controlled off meds  - Noncompliant w/ all home meds since January   - f/u TSH, A1c, LFTs  - f/u rpt TTE as above
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
## 2024-08-20 NOTE — H&P ADULT - PROBLEM SELECTOR PLAN 1
P/w syncopal episode, and reports total of 3 episodes  since Oct 2023, all blackout in nature without prodrome.   - TTE 10/7/23: LVEF 50% with global hypokinesis, Grade I LV DD, RV normal, No significant valvular disease, No pericardial effusion.  - Current Tele/EKG: NSR no acute ischemic changes; Trop and BNP negative.  - ER POCUS per MD verbal reveals normal EF, no BLines, no concerning findings. Formal Echo pending.    - Repeat TTE pending  - Consider EP MCOT if no tele/echo findings  - CTH No acute intracranial hemorrhage, mass effect, or midline shift.  +Paranasal sinus disease.  - F/up RVP, UA   - F/up orthostatics  - Monitor on Telemetry, continuous pulse ox; K>4, Mg>2

## 2024-08-20 NOTE — ED ADULT NURSE REASSESSMENT NOTE - NS ED NURSE REASSESS COMMENT FT1
Received report from Vicky MORRISON, Pt is resting on bed, AAOX4. Pt is c/o left lateral chest pain and low back pain. Pain med ordered awaiting to be verify by pharmacy.

## 2024-08-20 NOTE — ED PROVIDER NOTE - CADM POA URETHRAL CATHETER
1-year-old female presents with fever for 3 days.  Accompanied with sore throats and pain on swallowing.  Also noted to have swelling and discharge of bilateral eyes.  On examination patient is well-appearing in no acute distress.  Noted to have mildly injected conjunctiva with matted eyelashes.  Mild erythema of the posterior pharyngeal wall with tonsils grade 3.  Rapid strep was done and was negative.  Throat culture sent.   Advised guardian that the child likely has a viral illness and only supportive management in needed at this time. Guardians at bedside and participated in shared decision making. Instructed to return to the ED if with worsening of symptoms, signs of respiratory distress or signs of dehydration. Guardians counselled and anticipatory guidance provided. Guardians comfortable being discharged at this time and advised follow up with PMD.
No

## 2024-08-20 NOTE — H&P ADULT - PROBLEM SELECTOR PLAN 4
- Pt euvolemic on exam and stable on room air. BNP/Trop Neg. CXR clear.   - Noncompliant with home meds since Jan 2024, however continue to hold all in setting of normal/low BP.  F/up rpt echo to consider resuming. (Home meds: Farxiga 10mg, entresto 24/26mg PO BID, metoprolol 50mg qd, Spironolactone 25mg qd)  -Daily weights, fluid restrict, tele, optimize lytes

## 2024-08-20 NOTE — ED ADULT TRIAGE NOTE - CHIEF COMPLAINT QUOTE
Patient PMH Afib (ablation 2023) and heart failure to the ED c/o syncopal fall onto left side s/p moving to a standing position, not cooperative with triage questions. Has been off of medications, including entresto, since ablation "because why would I need to continue taking medications if I got an ablation". BGL WNL, AAOX4, NAD.

## 2024-08-20 NOTE — H&P ADULT - NSHPLABSRESULTS_GEN_ALL_CORE
9.4    4.80  )-----------( 244      ( 20 Aug 2024 07:23 )             30.8       08-20    137  |  106  |  10  ----------------------------<  87  4.3   |  22  |  1.19    Ca    8.9      20 Aug 2024 07:23    TPro  6.9  /  Alb  3.5  /  TBili  0.5  /  DBili  x   /  AST  11  /  ALT  5<L>  /  AlkPhos  108  08-20      PT/INR - ( 20 Aug 2024 07:23 )   PT: 11.7 sec;   INR: 1.03          PTT - ( 20 Aug 2024 07:23 )  PTT:33.8 sec          Urinalysis Basic - ( 20 Aug 2024 07:23 )    Color: x / Appearance: x / SG: x / pH: x  Gluc: 87 mg/dL / Ketone: x  / Bili: x / Urobili: x   Blood: x / Protein: x / Nitrite: x   Leuk Esterase: x / RBC: x / WBC x   Sq Epi: x / Non Sq Epi: x / Bacteria: x
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
## 2024-08-20 NOTE — H&P ADULT - NSICDXFAMILYHX_GEN_ALL_CORE_FT
I reviewed the H&P, I examined the patient, and there are no changes in the patient's condition.    
FAMILY HISTORY:  Father  Still living? Unknown  FH: CHF (congestive heart failure), Age at diagnosis: Age Unknown    Sibling  Still living? Unknown  FHx: atrial fibrillation, Age at diagnosis: Age Unknown

## 2024-08-20 NOTE — PATIENT PROFILE ADULT - FALL HARM RISK - RISK INTERVENTIONS
Assistance OOB with selected safe patient handling equipment/Assistance with ambulation/Communicate Fall Risk and Risk Factors to all staff, patient, and family/Discuss with provider need for PT consult/Monitor gait and stability/Provide patient with walking aids - walker, cane, crutches/Reinforce activity limits and safety measures with patient and family/Use of alarms - bed, chair and/or voice tab/Visual Cue: Yellow wristband/Bed in lowest position, wheels locked, appropriate side rails in place/Call bell, personal items and telephone in reach/Instruct patient to call for assistance before getting out of bed or chair/Non-slip footwear when patient is out of bed/[place] to call system/Physically safe environment - no spills, clutter or unnecessary equipment/Purposeful Proactive Rounding/Room/bathroom lighting operational, light cord in reach

## 2024-08-20 NOTE — ED ADULT NURSE NOTE - OBJECTIVE STATEMENT
Pt reports multiple syncopal episodes this morning, last episode was 1 hours PTA. Pt states he fell onto his L side from standing. Pt reports hx of afib with ablation and CHF. Pt is noncompliant with medications. Pt AOx4, ambulatory with steady gait. Pt reports multiple syncopal episodes this morning, last episode was 1 hours PTA. Pt states he fell onto his L side from standing. Pt reports hx of afib with ablation in October 2023 and CHF. Pt is noncompliant with medications since January, pt also advised by his cardiologist to wear Zoll Life vest post ablation, pt noncompliant. Pt c/o CP to L chest with radiation to L ribs. Pt refusing to answer some questions during assessment. Pt states he took oxycodone at home for pain relief, states "I don't know when I took it." Pt AOx4, ambulatory with steady gait from triage to bed.
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
## 2024-08-20 NOTE — H&P ADULT - PROBLEM SELECTOR PLAN 5
F/up lipid panel; noncompliant with home Crestor since January.    #chronic back pain and arthritic pain  - reports he is prescribed Percocet and Dilaudid in [facility] ERs.   - s/p Percocet x1 in ED, none further as d/w MD  - tenderness to L chest wall and L flank on exam but otherwise benign exam   - Lidocaine patch       N: Regular diet as pt refuses DASH/TLC   E: Replete lytes PRN K<4, Mg<2  P: DVT PPX: pending  FOBT; SCDs for now  C: FULL CODE  (Pt confirms Full Code GOC on 8/20/24)  Dispo: Pending workup  Case discussed with Dr. Sharif

## 2024-08-21 LAB
A1C WITH ESTIMATED AVERAGE GLUCOSE RESULT: 4.8 % — SIGNIFICANT CHANGE UP (ref 4–5.6)
ANION GAP SERPL CALC-SCNC: 8 MMOL/L — SIGNIFICANT CHANGE UP (ref 5–17)
BLD GP AB SCN SERPL QL: NEGATIVE — SIGNIFICANT CHANGE UP
BUN SERPL-MCNC: 10 MG/DL — SIGNIFICANT CHANGE UP (ref 7–23)
CALCIUM SERPL-MCNC: 9.3 MG/DL — SIGNIFICANT CHANGE UP (ref 8.4–10.5)
CHLORIDE SERPL-SCNC: 105 MMOL/L — SIGNIFICANT CHANGE UP (ref 96–108)
CHOLEST SERPL-MCNC: 119 MG/DL — SIGNIFICANT CHANGE UP
CO2 SERPL-SCNC: 24 MMOL/L — SIGNIFICANT CHANGE UP (ref 22–31)
CREAT SERPL-MCNC: 1.04 MG/DL — SIGNIFICANT CHANGE UP (ref 0.5–1.3)
EGFR: 87 ML/MIN/1.73M2 — SIGNIFICANT CHANGE UP
ESTIMATED AVERAGE GLUCOSE: 91 MG/DL — SIGNIFICANT CHANGE UP (ref 68–114)
FERRITIN SERPL-MCNC: 9 NG/ML — LOW (ref 30–400)
GLUCOSE SERPL-MCNC: 80 MG/DL — SIGNIFICANT CHANGE UP (ref 70–99)
HCT VFR BLD CALC: 33.4 % — LOW (ref 39–50)
HDLC SERPL-MCNC: 49 MG/DL — SIGNIFICANT CHANGE UP
HGB BLD-MCNC: 9.9 G/DL — LOW (ref 13–17)
IRON SATN MFR SERPL: 20 UG/DL — LOW (ref 45–165)
IRON SATN MFR SERPL: 6 % — LOW (ref 16–55)
LIPID PNL WITH DIRECT LDL SERPL: 56 MG/DL — SIGNIFICANT CHANGE UP
MAGNESIUM SERPL-MCNC: 2 MG/DL — SIGNIFICANT CHANGE UP (ref 1.6–2.6)
MCHC RBC-ENTMCNC: 26.5 PG — LOW (ref 27–34)
MCHC RBC-ENTMCNC: 29.6 GM/DL — LOW (ref 32–36)
MCV RBC AUTO: 89.3 FL — SIGNIFICANT CHANGE UP (ref 80–100)
NON HDL CHOLESTEROL: 70 MG/DL — SIGNIFICANT CHANGE UP
NRBC # BLD: 0 /100 WBCS — SIGNIFICANT CHANGE UP (ref 0–0)
PLATELET # BLD AUTO: 246 K/UL — SIGNIFICANT CHANGE UP (ref 150–400)
POTASSIUM SERPL-MCNC: 3.9 MMOL/L — SIGNIFICANT CHANGE UP (ref 3.5–5.3)
POTASSIUM SERPL-SCNC: 3.9 MMOL/L — SIGNIFICANT CHANGE UP (ref 3.5–5.3)
RBC # BLD: 3.74 M/UL — LOW (ref 4.2–5.8)
RBC # FLD: 15.4 % — HIGH (ref 10.3–14.5)
RH IG SCN BLD-IMP: NEGATIVE — SIGNIFICANT CHANGE UP
SODIUM SERPL-SCNC: 137 MMOL/L — SIGNIFICANT CHANGE UP (ref 135–145)
T3 SERPL-MCNC: 147 NG/DL — SIGNIFICANT CHANGE UP (ref 80–200)
T4 AB SER-ACNC: 8.76 UG/DL — SIGNIFICANT CHANGE UP (ref 4.5–11.7)
TIBC SERPL-MCNC: 310 UG/DL — SIGNIFICANT CHANGE UP (ref 220–430)
TRANSFERRIN SERPL-MCNC: 269 MG/DL — SIGNIFICANT CHANGE UP (ref 200–360)
TRIGL SERPL-MCNC: 69 MG/DL — SIGNIFICANT CHANGE UP
TSH SERPL-MCNC: 2.82 UIU/ML — SIGNIFICANT CHANGE UP (ref 0.27–4.2)
UIBC SERPL-MCNC: 290 UG/DL — SIGNIFICANT CHANGE UP (ref 110–370)
WBC # BLD: 3.51 K/UL — LOW (ref 3.8–10.5)
WBC # FLD AUTO: 3.51 K/UL — LOW (ref 3.8–10.5)

## 2024-08-21 PROCEDURE — 99232 SBSQ HOSP IP/OBS MODERATE 35: CPT

## 2024-08-21 RX ORDER — ACETAMINOPHEN 325 MG/1
1000 TABLET ORAL EVERY 6 HOURS
Refills: 0 | Status: DISCONTINUED | OUTPATIENT
Start: 2024-08-21 | End: 2024-08-25

## 2024-08-21 RX ORDER — SPIRONOLACTONE 25 MG/1
25 TABLET, FILM COATED ORAL DAILY
Refills: 0 | Status: DISCONTINUED | OUTPATIENT
Start: 2024-08-21 | End: 2024-08-25

## 2024-08-21 RX ORDER — POLYETHYLENE GLYCOL 3350 17 G/17G
17 POWDER, FOR SOLUTION ORAL DAILY
Refills: 0 | Status: DISCONTINUED | OUTPATIENT
Start: 2024-08-21 | End: 2024-08-23

## 2024-08-21 RX ORDER — ACETAMINOPHEN 325 MG/1
1000 TABLET ORAL EVERY 6 HOURS
Refills: 0 | Status: DISCONTINUED | OUTPATIENT
Start: 2024-08-21 | End: 2024-08-21

## 2024-08-21 RX ADMIN — SPIRONOLACTONE 25 MILLIGRAM(S): 25 TABLET, FILM COATED ORAL at 18:27

## 2024-08-21 RX ADMIN — ACETAMINOPHEN 1000 MILLIGRAM(S): 325 TABLET ORAL at 12:10

## 2024-08-21 RX ADMIN — ACETAMINOPHEN 1000 MILLIGRAM(S): 325 TABLET ORAL at 16:18

## 2024-08-21 RX ADMIN — Medication 5 MILLIGRAM(S): at 21:54

## 2024-08-21 RX ADMIN — Medication 1 PATCH: at 19:53

## 2024-08-21 RX ADMIN — ACETAMINOPHEN 1000 MILLIGRAM(S): 325 TABLET ORAL at 22:54

## 2024-08-21 RX ADMIN — Medication 1 PATCH: at 12:12

## 2024-08-21 RX ADMIN — ROSUVASTATIN CALCIUM 10 MILLIGRAM(S): 10 TABLET ORAL at 21:54

## 2024-08-21 RX ADMIN — ACETAMINOPHEN 1000 MILLIGRAM(S): 325 TABLET ORAL at 21:54

## 2024-08-21 NOTE — DISCHARGE NOTE PROVIDER - CARE PROVIDER_API CALL
Cardiology,   [address]  [place] [address]  Phone: [phone]  Fax: (   )    -  Follow Up Time:

## 2024-08-21 NOTE — DISCHARGE NOTE PROVIDER - NSDCMRMEDTOKEN_GEN_ALL_CORE_FT
Aldactone 25 mg oral tablet: 1 tab(s) orally once a day  Alocane Emergency Burn Pads Maximum Strength 4% topical film: Apply topically to affected area once a day  Eliquis 5 mg oral tablet: 1 tab(s) orally 2 times a day  Entresto 24 mg-26 mg oral tablet: 1 tab(s) orally 2 times a day  Farxiga 10 mg oral tablet: 1 tab(s) orally once a day  metoprolol succinate 50 mg oral capsule, extended release: 1 cap(s) orally once a day  rosuvastatin 10 mg oral capsule: 1 cap(s) orally once a day   acetaminophen 500 mg oral tablet: 2 tab(s) orally every 6 hours As needed Moderate Pain (4 - 6)  Aldactone 25 mg oral tablet: 1 tab(s) orally once a day  Alocane Emergency Burn Pads Maximum Strength 4% topical film: Apply topically to affected area once a day  Eliquis 5 mg oral tablet: 1 tab(s) orally 2 times a day  Farxiga 10 mg oral tablet: 1 tab(s) orally once a day  ferrous sulfate 325 mg (65 mg elemental iron) oral tablet: 1 tab(s) orally once a day  metoprolol succinate 50 mg oral capsule, extended release: 1 cap(s) orally once a day  rosuvastatin 10 mg oral capsule: 1 cap(s) orally once a day   acetaminophen 500 mg oral tablet: 2 tab(s) orally every 6 hours As needed Moderate Pain (4 - 6)  Aldactone 25 mg oral tablet: 1 tab(s) orally once a day  Alocane Emergency Burn Pads Maximum Strength 4% topical film: Apply topically to affected area once a day  Eliquis 5 mg oral tablet: 1 tab(s) orally 2 times a day  Farxiga 10 mg oral tablet: 1 tab(s) orally once a day  ferrous sulfate 325 mg (65 mg elemental iron) oral tablet: 1 tab(s) orally once a day  metoprolol succinate 50 mg oral capsule, extended release: 1 cap(s) orally once a day  polyethylene glycol 3350 oral powder for reconstitution: 17 gram(s) orally 2 times a day as needed for  constipation  rosuvastatin 10 mg oral capsule: 1 cap(s) orally once a day  senna leaf extract oral tablet: 2 tab(s) orally once a day (at bedtime) as needed for  constipation   acetaminophen 500 mg oral tablet: 2 tab(s) orally every 6 hours As needed Moderate Pain (4 - 6)  Aldactone 25 mg oral tablet: 1 tab(s) orally once a day  Alocane Emergency Burn Pads Maximum Strength 4% topical film: Apply topically to affected area once a day  Eliquis 5 mg oral tablet: 1 tab(s) orally 2 times a day  Entresto 24 mg-26 mg oral tablet: 1 tab(s) orally 2 times a day  Farxiga 10 mg oral tablet: 1 tab(s) orally once a day  ferrous sulfate 325 mg (65 mg elemental iron) oral tablet: 1 tab(s) orally once a day  metoprolol succinate 50 mg oral capsule, extended release: 1 cap(s) orally once a day  polyethylene glycol 3350 oral powder for reconstitution: 17 gram(s) orally 2 times a day as needed for  constipation  rosuvastatin 10 mg oral capsule: 1 cap(s) orally once a day  senna leaf extract oral tablet: 2 tab(s) orally once a day (at bedtime) as needed for  constipation
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
## 2024-08-21 NOTE — DISCHARGE NOTE PROVIDER - NSDCFUADDAPPT_GEN_ALL_CORE_FT
Please follow up with cardiology 1-2 weeks after discharge. They will call you with the first available appointment. If you don't hear from them - please call [phone] or [phone].
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
## 2024-08-21 NOTE — PROGRESS NOTE ADULT - SUBJECTIVE AND OBJECTIVE BOX
Interventional Cardiology PA Adult Progress Note    Subjective Assessment: Pt seen and examined this AM. Pt was     ROS negative except as noted above.  	  MEDICATIONS:        acetaminophen     Tablet .. 1000 milliGRAM(s) Oral every 6 hours PRN      rosuvastatin 10 milliGRAM(s) Oral at bedtime    lidocaine   4% Patch 1 Patch Transdermal daily PRN      	    PHYSICAL EXAM:  TELEMETRY:  T(C): 37.1 (08-21-24 @ 05:40), Max: 37.1 (08-20-24 @ 18:18)  HR: 84 (08-21-24 @ 09:16) (78 - 84)  BP: 128/92 (08-21-24 @ 09:16) (127/65 - 144/91)  RR: 18 (08-21-24 @ 09:16) (18 - 18)  SpO2: 98% (08-21-24 @ 05:40) (98% - 100%)  Wt(kg): --  I&O's Summary    20 Aug 2024 07:01  -  21 Aug 2024 07:00  --------------------------------------------------------  IN: 0 mL / OUT: 800 mL / NET: -800 mL                                              Appearance: Normal	  HEENT: Normal oral mucosa 	  Neck: Supple- JVD  Cardiovascular: Normal S1 S2,  No murmurs,   Respiratory: LCTA b/l, No Rales, Rhonchi, Wheezing	  Gastrointestinal:  Soft, Non-tender  Skin: No rashes, No ecchymoses, No cyanosis  Extremities: Normal range of motion, No clubbing, cyanosis or edema  Vascular: Peripheral pulses palpable 2+ bilaterally  Neurologic: Non-focal  Psychiatry: A & O x 3, Mood & affect appropriate    LABS:	 	  CARDIAC MARKERS:                                  9.9    3.51  )-----------( 246      ( 21 Aug 2024 05:30 )             33.4     08-21    137  |  105  |  10  ----------------------------<  80  3.9   |  24  |  1.04    Ca    9.3      21 Aug 2024 05:30  Mg     2.0     08-21    TPro  6.9  /  Alb  3.5  /  TBili  0.5  /  DBili  x   /  AST  11  /  ALT  5<L>  /  AlkPhos  108  08-20    proBNP:   Lipid Profile:   HgA1c:   TSH: Thyroid Stimulating Hormone, Serum: 2.820 uIU/mL (08-21 @ 05:30)    PT/INR - ( 20 Aug 2024 07:23 )   PT: 11.7 sec;   INR: 1.03          PTT - ( 20 Aug 2024 07:23 )  PTT:33.8 sec Interventional Cardiology PA Adult Progress Note    Subjective Assessment: Pt seen and examined this AM. Pt was resting in bed, requesting his ensure and not complying with answering questions.     ROS negative except as noted above.  	  MEDICATIONS:        acetaminophen     Tablet .. 1000 milliGRAM(s) Oral every 6 hours PRN      rosuvastatin 10 milliGRAM(s) Oral at bedtime    lidocaine   4% Patch 1 Patch Transdermal daily PRN      	    PHYSICAL EXAM:  TELEMETRY:  T(C): 37.1 (08-21-24 @ 05:40), Max: 37.1 (08-20-24 @ 18:18)  HR: 84 (08-21-24 @ 09:16) (78 - 84)  BP: 128/92 (08-21-24 @ 09:16) (127/65 - 144/91)  RR: 18 (08-21-24 @ 09:16) (18 - 18)  SpO2: 98% (08-21-24 @ 05:40) (98% - 100%)  Wt(kg): --  I&O's Summary    20 Aug 2024 07:01  -  21 Aug 2024 07:00  --------------------------------------------------------  IN: 0 mL / OUT: 800 mL / NET: -800 mL                                              Appearance: Normal	  HEENT: Normal oral mucosa 	  Neck: Supple- JVD  Cardiovascular: Normal S1 S2,  No murmurs,   Respiratory: LCTA b/l, No Rales, Rhonchi, Wheezing	  Gastrointestinal:  Soft, Non-tender  Skin: No rashes, No ecchymoses, No cyanosis  Extremities: Normal range of motion, No clubbing, cyanosis or edema  Vascular: Peripheral pulses palpable 2+ bilaterally  Neurologic: Non-focal  Psychiatry: A & O x 3, Mood & affect appropriate    LABS:	 	  CARDIAC MARKERS:                                  9.9    3.51  )-----------( 246      ( 21 Aug 2024 05:30 )             33.4     08-21    137  |  105  |  10  ----------------------------<  80  3.9   |  24  |  1.04    Ca    9.3      21 Aug 2024 05:30  Mg     2.0     08-21    TPro  6.9  /  Alb  3.5  /  TBili  0.5  /  DBili  x   /  AST  11  /  ALT  5<L>  /  AlkPhos  108  08-20    proBNP:   Lipid Profile:   HgA1c:   TSH: Thyroid Stimulating Hormone, Serum: 2.820 uIU/mL (08-21 @ 05:30)    PT/INR - ( 20 Aug 2024 07:23 )   PT: 11.7 sec;   INR: 1.03          PTT - ( 20 Aug 2024 07:23 )  PTT:33.8 sec Interventional Cardiology PA Adult Progress Note    Subjective Assessment: Pt seen and examined this AM. Pt was resting in bed, requesting his ensure and not complying with answering questions.     ROS negative except as noted above.  	  MEDICATIONS:  acetaminophen     Tablet .. 1000 milliGRAM(s) Oral every 6 hours PRN  rosuvastatin 10 milliGRAM(s) Oral at bedtime  lidocaine   4% Patch 1 Patch Transdermal daily PRN      	    PHYSICAL EXAM:  TELEMETRY:  T(C): 37.1 (08-21-24 @ 05:40), Max: 37.1 (08-20-24 @ 18:18)  HR: 84 (08-21-24 @ 09:16) (78 - 84)  BP: 128/92 (08-21-24 @ 09:16) (127/65 - 144/91)  RR: 18 (08-21-24 @ 09:16) (18 - 18)  SpO2: 98% (08-21-24 @ 05:40) (98% - 100%)  Wt(kg): --  I&O's Summary    20 Aug 2024 07:01  -  21 Aug 2024 07:00  --------------------------------------------------------  IN: 0 mL / OUT: 800 mL / NET: -800 mL                                              Appearance: Normal	  HEENT: Normal oral mucosa 	  Neck: Supple- JVD  Cardiovascular: Normal S1 S2,  No murmurs,   Respiratory: LCTA b/l, No Rales, Rhonchi, Wheezing	  Gastrointestinal:  Soft, Non-tender  Skin: No rashes, No ecchymoses, No cyanosis  Extremities: Normal range of motion, No clubbing, cyanosis or edema  Vascular: Peripheral pulses palpable 2+ bilaterally  Neurologic: Non-focal  Psychiatry: A & O x 3, Mood & affect appropriate    LABS:	 	  CARDIAC MARKERS:                                  9.9    3.51  )-----------( 246      ( 21 Aug 2024 05:30 )             33.4     08-21    137  |  105  |  10  ----------------------------<  80  3.9   |  24  |  1.04    Ca    9.3      21 Aug 2024 05:30  Mg     2.0     08-21    TPro  6.9  /  Alb  3.5  /  TBili  0.5  /  DBili  x   /  AST  11  /  ALT  5<L>  /  AlkPhos  108  08-20    proBNP:   Lipid Profile:   HgA1c:   TSH: Thyroid Stimulating Hormone, Serum: 2.820 uIU/mL (08-21 @ 05:30)    PT/INR - ( 20 Aug 2024 07:23 )   PT: 11.7 sec;   INR: 1.03          PTT - ( 20 Aug 2024 07:23 )  PTT:33.8 sec

## 2024-08-21 NOTE — DISCHARGE NOTE PROVIDER - PROVIDER TOKENS
FREE:[LAST:[Cardiology],PHONE:[phone]],FAX:[(   )    -],ADDRESS:[[address] [place] [address] [address]  [place] [address]]]
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
## 2024-08-21 NOTE — DISCHARGE NOTE PROVIDER - NSDCCPCAREPLAN_GEN_ALL_CORE_FT
PRINCIPAL DISCHARGE DIAGNOSIS  Diagnosis: Syncope  Assessment and Plan of Treatment: You were admitted to the hospital because you lost consciousness. The CT Scan of your brain was without acute bleeding. Your heart enzymes (markers of heart injury in the blood) were negative. Your Echocardiogram (heart Ultrasound) showed an improved ejection fraction of 55-60%.      SECONDARY DISCHARGE DIAGNOSES  Diagnosis: Orthostatic hypotension  Assessment and Plan of Treatment: Orthostatic hypotension is a drop in blood pressure upon changing positions, such as moving from sitting to standing. Orthostatic hypotension may cause lightheadedness and dizziness, which can result in passing out, fatigue and nausea. It could also contribute to gait instability and falls.   To avoid orthostatic hypotension:    • Avoid suddenly standing from a sitting bending or lying position; take it in stages   • Before getting out of bed perform arm and leg exercises when lying and again when sitting; crossing and uncrossing your legs firmly may also help(blood pressure is lowest in the morning so extra care may need to be taken)   • Avoid prolonged sitting, lying or standing e.g. bus queues; if this is unavoidable clench and unclench your calf muscles to encourage the blood flow.   • Wear high support stockings during the day and remove them before going to bed   • Some people have large drops in blood pressure 1 to 2 hours after meals; smaller and more frequent meals avoid this.   • Drink enough fluids to keep your urine clear throughout the day; try to drink 1 to 2 liters earlier in the day. Avoid excessive alcohol.      Diagnosis: Iron deficiency anemia  Assessment and Plan of Treatment: Anemia is a condition that develops when your blood produces a lower-than-normal amount of healthy red blood cells. If you have anemia, your body does not get enough oxygen-rich blood. The lack of oxygen can make you feel tired or weak. You may also have shortness of breath, dizziness, headaches, or an irregular heartbeat. You were found to have iron deficieny anemia, meaning your body is deficient in iron. Please START taking ferrous sulfate 325mg daily.   Prior to discharge, your Hgb is ___.   Please follow-up with your PMD ___.      Diagnosis: Atrial fibrillation  Assessment and Plan of Treatment: You have an abnormal heart rhythm (arrhythmia) called atrial fibrillation. With this condition, the hearts 2 upper chambers (the atria) quiver rather than squeeze the blood out in a normal pattern. This leads to an irregular and sometimes rapid heartbeat. Atrial fibrillation is serious condition as it affects the heart’s ability to fill with blood, and the blood can start to form clots.  These clots can travel to the brain through the blood vessels, and cause strokes.    -Please CONTINUE  ELIQUIS 5MG TWICE A DAY to prevent a stroke by helping to prevent clots from forming.   -Please CONTINUE METOPROLOL SUCCINATE (TOPROL) 50MG DAILY to keep your heart in rate regular   - Please follow up with  ___.       PRINCIPAL DISCHARGE DIAGNOSIS  Diagnosis: Syncope  Assessment and Plan of Treatment: You were admitted to the hospital because you lost consciousness. The CT Scan of your brain was without acute bleeding. Your heart enzymes (markers of heart injury in the blood) were negative. Your Echocardiogram (heart Ultrasound) showed an improved ejection fraction of 55-60%.      SECONDARY DISCHARGE DIAGNOSES  Diagnosis: Orthostatic hypotension  Assessment and Plan of Treatment: Orthostatic hypotension is a drop in blood pressure upon changing positions, such as moving from sitting to standing. Orthostatic hypotension may cause lightheadedness and dizziness, which can result in passing out, fatigue and nausea. It could also contribute to gait instability and falls.   To avoid orthostatic hypotension:    • Avoid suddenly standing from a sitting bending or lying position; take it in stages   • Before getting out of bed perform arm and leg exercises when lying and again when sitting; crossing and uncrossing your legs firmly may also help(blood pressure is lowest in the morning so extra care may need to be taken)   • Avoid prolonged sitting, lying or standing e.g. bus queues; if this is unavoidable clench and unclench your calf muscles to encourage the blood flow.   • Wear high support stockings during the day and remove them before going to bed   • Some people have large drops in blood pressure 1 to 2 hours after meals; smaller and more frequent meals avoid this.   • Drink enough fluids to keep your urine clear throughout the day; try to drink 1 to 2 liters earlier in the day. Avoid excessive alcohol.      Diagnosis: Iron deficiency anemia  Assessment and Plan of Treatment: Anemia is a condition that develops when your blood produces a lower-than-normal amount of healthy red blood cells. If you have anemia, your body does not get enough oxygen-rich blood. The lack of oxygen can make you feel tired or weak. You may also have shortness of breath, dizziness, headaches, or an irregular heartbeat. You were found to have iron deficieny anemia, meaning your body is deficient in iron. Please START taking ferrous sulfate 325mg daily.   Prior to discharge, your Hgb is ___.   Please follow-up with your PMD ___.      Diagnosis: Atrial fibrillation  Assessment and Plan of Treatment: You have an abnormal heart rhythm (arrhythmia) called atrial fibrillation. With this condition, the hearts 2 upper chambers (the atria) quiver rather than squeeze the blood out in a normal pattern. This leads to an irregular and sometimes rapid heartbeat. Atrial fibrillation is serious condition as it affects the heart’s ability to fill with blood, and the blood can start to form clots.  These clots can travel to the brain through the blood vessels, and cause strokes.    -Please CONTINUE  ELIQUIS 5MG TWICE A DAY to prevent a stroke by helping to prevent clots from forming.   -Please CONTINUE METOPROLOL SUCCINATE (TOPROL) 50MG DAILY to keep your heart in rate regular   - Please follow up with Dr. Major at your routine follow up appt.        PRINCIPAL DISCHARGE DIAGNOSIS  Diagnosis: Syncope  Assessment and Plan of Treatment: You were admitted to the hospital because you lost consciousness. The CT Scan of your brain was without acute bleeding. Your heart enzymes (markers of heart injury in the blood) were negative. Your Echocardiogram (heart Ultrasound) showed an improved ejection fraction of 55-60%. Please follow up with a cardiologist - the office will call you with the first available appointment.      SECONDARY DISCHARGE DIAGNOSES  Diagnosis: Orthostatic hypotension  Assessment and Plan of Treatment: Orthostatic hypotension is a drop in blood pressure upon changing positions, such as moving from sitting to standing. Orthostatic hypotension may cause lightheadedness and dizziness, which can result in passing out, fatigue and nausea. It could also contribute to gait instability and falls.   To avoid orthostatic hypotension:    • Avoid suddenly standing from a sitting bending or lying position; take it in stages   • Before getting out of bed perform arm and leg exercises when lying and again when sitting; crossing and uncrossing your legs firmly may also help(blood pressure is lowest in the morning so extra care may need to be taken)   • Avoid prolonged sitting, lying or standing e.g. bus queues; if this is unavoidable clench and unclench your calf muscles to encourage the blood flow.   • Wear high support stockings during the day and remove them before going to bed   • Some people have large drops in blood pressure 1 to 2 hours after meals; smaller and more frequent meals avoid this.   • Drink enough fluids to keep your urine clear throughout the day; try to drink 1 to 2 liters earlier in the day. Avoid excessive alcohol.      Diagnosis: Iron deficiency anemia  Assessment and Plan of Treatment: Anemia is a condition that develops when your blood produces a lower-than-normal amount of healthy red blood cells. If you have anemia, your body does not get enough oxygen-rich blood. The lack of oxygen can make you feel tired or weak. You may also have shortness of breath, dizziness, headaches, or an irregular heartbeat. You were found to have iron deficieny anemia, meaning your body is deficient in iron. Please START taking ferrous sulfate 325mg daily.   Prior to discharge, your Hgb is 10.8  Please follow-up with your PMD 1-2 weeks after discharge.       Diagnosis: Atrial fibrillation  Assessment and Plan of Treatment: You have an abnormal heart rhythm (arrhythmia) called atrial fibrillation. With this condition, the hearts 2 upper chambers (the atria) quiver rather than squeeze the blood out in a normal pattern. This leads to an irregular and sometimes rapid heartbeat. Atrial fibrillation is serious condition as it affects the heart’s ability to fill with blood, and the blood can start to form clots.  These clots can travel to the brain through the blood vessels, and cause strokes.    -Please CONTINUE  ELIQUIS 5MG TWICE A DAY to prevent a stroke by helping to prevent clots from forming.   -Please CONTINUE METOPROLOL SUCCINATE (TOPROL) 50MG DAILY to keep your heart in rate regular   - Please follow up with your cardiologist.       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  This can lead to symptoms such as swelling, trouble breathing, and feeling tired.   -You have a history of weakened heart muscle called systolic congestive heart failure however now have a recovered Ejection Fraction (EF) of 55-60%.    -Please continue Spironolactone 25mg and Metoprolol succinate 50mg daily to prevent fluid build up in the body and strengthen your heart muscle.   -Avoid drinking more than 1.5L of fluid daily and maintain a low salt diet (max 2grams daily).  -Please weigh yourself daily, for any significant increases in daily weight of 2lbs/day or 5lbs/week with associated swelling in the legs or abdomen and/or shortness of breath, please call your doctor or go to the emergency room.  -Please make sure you follow up with a cardiologist 1-2 weeks after discharge.  They will call you with an appointment.

## 2024-08-21 NOTE — PROGRESS NOTE ADULT - PROBLEM SELECTOR PLAN 2
- Currently NSR with HR 80s.   - EP Hx: two [facility] at [facility]; Most recently s/p successful [facility] AFib ablation on 10/10/2023 via groin access.   - AC: Chadsvasc2. Labs reveal new Anemia of Hgb 9.4 Hct 30.8 (Oct 2023 baseline 13-14/40-43).  F/up FOBT.  If negative, resume Eliquis 5mg PO BID  - Rate: currently controlled off meds  - Noncompliant w/ all home meds since January   - f/u TSH, A1c, LFTs  - f/u rpt TTE as above - Currently NSR with HR 80s.   - EP Hx: two [facility] at [facility]; Most recently s/p successful [facility] AFib ablation on 10/10/2023 via groin access.   - AC: F/up FOBT.  If negative, resume Eliquis 5mg PO BID  - Rate: currently controlled off meds
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
## 2024-08-21 NOTE — PROGRESS NOTE ADULT - PROBLEM SELECTOR PLAN 1
P/w syncopal episode, and reports total of 3 episodes  since Oct 2023, all blackout in nature without prodrome.   - TTE 10/7/23: LVEF 50% with global hypokinesis, Grade I LV DD, RV normal, No significant valvular disease, No pericardial effusion.  - Current Tele/EKG: NSR no acute ischemic changes; Trop and BNP negative.  - Repeat TTE pending  - Consider EP MCOT if no tele/echo findings  - CTH No acute intracranial hemorrhage, mass effect, or midline shift.  +Paranasal sinus disease.  - F/up RVP, UA   - F/up orthostatics  - Monitor on Telemetry, continuous pulse ox; K>4, Mg>2 P/w syncopal episode, and reports total of 3 episodes since Oct 2023, all blackout in nature without prodrome.   - Current Tele/EKG: NSR no acute ischemic changes; Trop and BNP negative.  - Covid +  -Orthostatics +  - f/u TTE results  o EP MCOT if no tele/echo findings  - CTH negative P/w syncopal episode, and reports total of 3 episodes since Oct 2023, all blackout in nature without prodrome.   - Current Tele/EKG: NSR no acute ischemic changes; Trop and BNP negative.  - Covid +  -Orthostatics +  - f/u TTE results  - CTH negative P/w syncopal episode, and reports total of 3 episodes since Oct 2023, all blackout in nature without prodrome.   - Current Tele/EKG: NSR no acute ischemic changes; Trop and BNP negative; no events on tele  - Covid +  -Orthostatics +  - f/u TTE results  - CTH negative P/w syncopal episode, and reports total of 3 episodes since Oct 2023, all blackout in nature without prodrome.   - Current Tele/EKG: NSR no acute ischemic changes; Trop and BNP negative; no events on tele  - CTH negative  - Covid +  - Orthostatics +  - pending TTE

## 2024-08-21 NOTE — PROGRESS NOTE ADULT - PROBLEM SELECTOR PLAN 5
F/up lipid panel; noncompliant with home Crestor since January.    #chronic back pain and arthritic pain  - c/w   -  - tenderness to L chest wall and L flank on exam but otherwise benign exam   - Lidocaine patch       N: Regular diet as pt refuses DASH/TLC   E: Replete lytes PRN K<4, Mg<2  P: DVT PPX: pending  FOBT; SCDs for now  C: FULL CODE  (Pt confirms Full Code GOC on 8/20/24)  Dispo: Pending workup  Case discussed with Dr. Sharif Total 119, LDL 56, Triglycerides 69, HDL 49  - c/w Crestor 10mg QD        N: Regular diet as pt refuses DASH/TLC   E: Replete lytes PRN K<4, Mg<2  P: DVT PPX: pending  FOBT; SCDs for now  C: FULL CODE  (Pt confirms Full Code GOC on 8/20/24)  Dispo: Pending workup  Case discussed with Dr. Sharif
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
## 2024-08-22 ENCOUNTER — RESULT REVIEW (OUTPATIENT)
Age: 49
End: 2024-08-22

## 2024-08-22 LAB
ANION GAP SERPL CALC-SCNC: 7 MMOL/L — SIGNIFICANT CHANGE UP (ref 5–17)
BUN SERPL-MCNC: 9 MG/DL — SIGNIFICANT CHANGE UP (ref 7–23)
CALCIUM SERPL-MCNC: 8.9 MG/DL — SIGNIFICANT CHANGE UP (ref 8.4–10.5)
CHLORIDE SERPL-SCNC: 108 MMOL/L — SIGNIFICANT CHANGE UP (ref 96–108)
CO2 SERPL-SCNC: 24 MMOL/L — SIGNIFICANT CHANGE UP (ref 22–31)
CREAT SERPL-MCNC: 1.07 MG/DL — SIGNIFICANT CHANGE UP (ref 0.5–1.3)
EGFR: 84 ML/MIN/1.73M2 — SIGNIFICANT CHANGE UP
GLUCOSE SERPL-MCNC: 91 MG/DL — SIGNIFICANT CHANGE UP (ref 70–99)
HCT VFR BLD CALC: 31.1 % — LOW (ref 39–50)
HGB BLD-MCNC: 9 G/DL — LOW (ref 13–17)
HIV 1+2 AB+HIV1 P24 AG SERPL QL IA: SIGNIFICANT CHANGE UP
MAGNESIUM SERPL-MCNC: 1.9 MG/DL — SIGNIFICANT CHANGE UP (ref 1.6–2.6)
MCHC RBC-ENTMCNC: 24.7 PG — LOW (ref 27–34)
MCHC RBC-ENTMCNC: 28.9 GM/DL — LOW (ref 32–36)
MCV RBC AUTO: 85.2 FL — SIGNIFICANT CHANGE UP (ref 80–100)
NRBC # BLD: 0 /100 WBCS — SIGNIFICANT CHANGE UP (ref 0–0)
PLATELET # BLD AUTO: 257 K/UL — SIGNIFICANT CHANGE UP (ref 150–400)
POTASSIUM SERPL-MCNC: 4.4 MMOL/L — SIGNIFICANT CHANGE UP (ref 3.5–5.3)
POTASSIUM SERPL-SCNC: 4.4 MMOL/L — SIGNIFICANT CHANGE UP (ref 3.5–5.3)
RBC # BLD: 3.65 M/UL — LOW (ref 4.2–5.8)
RBC # FLD: 15 % — HIGH (ref 10.3–14.5)
SODIUM SERPL-SCNC: 139 MMOL/L — SIGNIFICANT CHANGE UP (ref 135–145)
WBC # BLD: 3.47 K/UL — LOW (ref 3.8–10.5)
WBC # FLD AUTO: 3.47 K/UL — LOW (ref 3.8–10.5)

## 2024-08-22 PROCEDURE — 93306 TTE W/DOPPLER COMPLETE: CPT | Mod: 26

## 2024-08-22 PROCEDURE — 99232 SBSQ HOSP IP/OBS MODERATE 35: CPT

## 2024-08-22 RX ORDER — MAGNESIUM OXIDE TAB 400 MG (240 MG ELEMENTAL MG) 400 (240 MG) MG
400 TAB ORAL ONCE
Refills: 0 | Status: COMPLETED | OUTPATIENT
Start: 2024-08-22 | End: 2024-08-22

## 2024-08-22 RX ORDER — SODIUM CHLORIDE 9 MG/ML
250 INJECTION INTRAMUSCULAR; INTRAVENOUS; SUBCUTANEOUS ONCE
Refills: 0 | Status: COMPLETED | OUTPATIENT
Start: 2024-08-22 | End: 2024-08-22

## 2024-08-22 RX ORDER — GUAIFENESIN 100 MG/5ML
100 LIQUID ORAL ONCE
Refills: 0 | Status: COMPLETED | OUTPATIENT
Start: 2024-08-22 | End: 2024-08-22

## 2024-08-22 RX ORDER — METOPROLOL TARTRATE 100 MG/1
50 TABLET ORAL DAILY
Refills: 0 | Status: DISCONTINUED | OUTPATIENT
Start: 2024-08-22 | End: 2024-08-25

## 2024-08-22 RX ORDER — BENZONATATE 100 MG
100 CAPSULE ORAL THREE TIMES A DAY
Refills: 0 | Status: DISCONTINUED | OUTPATIENT
Start: 2024-08-22 | End: 2024-08-25

## 2024-08-22 RX ORDER — SENNA 187 MG
2 TABLET ORAL AT BEDTIME
Refills: 0 | Status: DISCONTINUED | OUTPATIENT
Start: 2024-08-22 | End: 2024-08-25

## 2024-08-22 RX ADMIN — SPIRONOLACTONE 25 MILLIGRAM(S): 25 TABLET, FILM COATED ORAL at 05:07

## 2024-08-22 RX ADMIN — ACETAMINOPHEN 1000 MILLIGRAM(S): 325 TABLET ORAL at 22:01

## 2024-08-22 RX ADMIN — ROSUVASTATIN CALCIUM 10 MILLIGRAM(S): 10 TABLET ORAL at 21:00

## 2024-08-22 RX ADMIN — Medication 2 TABLET(S): at 21:01

## 2024-08-22 RX ADMIN — Medication 1 PATCH: at 00:04

## 2024-08-22 RX ADMIN — METOPROLOL TARTRATE 50 MILLIGRAM(S): 100 TABLET ORAL at 21:01

## 2024-08-22 RX ADMIN — ACETAMINOPHEN 1000 MILLIGRAM(S): 325 TABLET ORAL at 05:07

## 2024-08-22 RX ADMIN — ACETAMINOPHEN 1000 MILLIGRAM(S): 325 TABLET ORAL at 06:07

## 2024-08-22 RX ADMIN — Medication 5 MILLIGRAM(S): at 21:01

## 2024-08-22 RX ADMIN — ACETAMINOPHEN 1000 MILLIGRAM(S): 325 TABLET ORAL at 21:01

## 2024-08-22 RX ADMIN — SODIUM CHLORIDE 1500 MILLILITER(S): 9 INJECTION INTRAMUSCULAR; INTRAVENOUS; SUBCUTANEOUS at 18:57

## 2024-08-22 RX ADMIN — MAGNESIUM OXIDE TAB 400 MG (240 MG ELEMENTAL MG) 400 MILLIGRAM(S): 400 (240 MG) TAB at 12:31

## 2024-08-22 RX ADMIN — Medication 100 MILLIGRAM(S): at 12:31

## 2024-08-22 RX ADMIN — GUAIFENESIN 100 MILLIGRAM(S): 100 LIQUID ORAL at 06:19

## 2024-08-22 RX ADMIN — Medication 100 MILLIGRAM(S): at 21:00

## 2024-08-22 RX ADMIN — POLYETHYLENE GLYCOL 3350 17 GRAM(S): 17 POWDER, FOR SOLUTION ORAL at 11:29
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
## 2024-08-22 NOTE — DIETITIAN INITIAL EVALUATION ADULT - PROBLEM SELECTOR PLAN 2
- Currently NSR with HR 80s.   - EP Hx: two [facility] at [facility]; Most recently s/p successful [facility] AFib ablation on 10/10/2023 via groin access.   - AC: Chadsvasc2. Labs reveal new Anemia of Hgb 9.4 Hct 30.8 (Oct 2023 baseline 13-14/40-43).  F/up FOBT.  If negative, resume Eliquis 5mg PO BID  - Rate: currently controlled off meds  - Noncompliant w/ all home meds since January   - f/u TSH, A1c, LFTs  - f/u rpt TTE as above

## 2024-08-22 NOTE — DIETITIAN INITIAL EVALUATION ADULT - NS FNS DIET ORDER
Diet, Regular:   Supplement Feeding Modality:  Oral  Ensure Enlive Cans or Servings Per Day:  2       Frequency:  Three Times a day (08-22-24 @ 14:25)

## 2024-08-22 NOTE — PROGRESS NOTE ADULT - SUBJECTIVE AND OBJECTIVE BOX
Interventional Cardiology PA Adult Progress Note    Subjective Assessment:    ROS negative except as noted above.  	  MEDICATIONS:  spironolactone 25 milliGRAM(s) Oral daily        acetaminophen     Tablet .. 1000 milliGRAM(s) Oral every 6 hours PRN  melatonin 5 milliGRAM(s) Oral at bedtime    polyethylene glycol 3350 17 Gram(s) Oral daily    rosuvastatin 10 milliGRAM(s) Oral at bedtime    lidocaine   4% Patch 1 Patch Transdermal daily PRN      	    PHYSICAL EXAM:  TELEMETRY:  T(C): 37.1 (08-22-24 @ 05:07), Max: 37.1 (08-21-24 @ 20:23)  HR: 85 (08-22-24 @ 05:07) (76 - 90)  BP: 115/65 (08-22-24 @ 05:07) (115/65 - 134/93)  RR: 18 (08-22-24 @ 05:07) (18 - 18)  SpO2: 98% (08-22-24 @ 05:07) (98% - 100%)  Wt(kg): --  I&O's Summary    21 Aug 2024 07:01  -  22 Aug 2024 07:00  --------------------------------------------------------  IN: 740 mL / OUT: 300 mL / NET: 440 mL                                              Appearance: Normal	  HEENT:   Normal oral mucosa, PERRLA, EOMI	  Neck: Supple, + JVD/ - JVD; Carotid Bruit   Cardiovascular: Normal S1 S2, No JVD, No murmurs,   Respiratory: Lungs clear to auscultation/Decreased Breath Sounds/No Rales, Rhonchi, Wheezing	  Gastrointestinal:  Soft, Non-tender, + BS	  Skin: No rashes, No ecchymoses, No cyanosis  Extremities: Normal range of motion, No clubbing, cyanosis or edema  Vascular: Peripheral pulses palpable 2+ bilaterally  Neurologic: Non-focal  Psychiatry: A & O x 3, Mood & affect appropriate    LABS:	 	  CARDIAC MARKERS:                                  9.0    3.47  )-----------( 257      ( 22 Aug 2024 08:07 )             31.1     08-22    139  |  108  |  9   ----------------------------<  91  4.4   |  24  |  1.07    Ca    8.9      22 Aug 2024 08:07  Mg     1.9     08-22      proBNP:   Lipid Profile:   HgA1c:   TSH:    Interventional Cardiology PA Adult Progress Note    Subjective Assessment: Seen and examined bedside. Denies chest pain, palpitations, SOB, orthopnea/PND, dizziness, LOC, n/v/d, fever/chills/sick contacts, LE edema. More conversive today and in a better mood, requesting HIV panel and 2 Ensures with every meal    ROS negative except as noted above.  	  MEDICATIONS:  spironolactone 25 milliGRAM(s) Oral daily  acetaminohen     Tablet .. 1000 milliGRAM(s) Oral every 6 hours PRN  melatonin 5 milliGRAM(s) Oral at bedtime  polyethylene glycol 3350 17 Gram(s) Oral daily  rosuvastatin 10 milliGRAM(s) Oral at bedtime  lidocaine   4% Patch 1 Patch Transdermal daily PRN      	    PHYSICAL EXAM:  TELEMETRY:  T(C): 37.1 (08-22-24 @ 05:07), Max: 37.1 (08-21-24 @ 20:23)  HR: 85 (08-22-24 @ 05:07) (76 - 90)  BP: 115/65 (08-22-24 @ 05:07) (115/65 - 134/93)  RR: 18 (08-22-24 @ 05:07) (18 - 18)  SpO2: 98% (08-22-24 @ 05:07) (98% - 100%)  Wt(kg): --  I&O's Summary    21 Aug 2024 07:01  -  22 Aug 2024 07:00  --------------------------------------------------------  IN: 740 mL / OUT: 300 mL / NET: 440 mL                                              Appearance: Normal	  HEENT:   Normal oral mucosa, PERRLA, EOMI	  Neck: Supple, - JVD; Carotid Bruit   Cardiovascular: Normal S1 S2, No JVD, No murmurs,   Respiratory: Lungs clear to auscultation, no Rales, Rhonchi, Wheezing	  Gastrointestinal:  Soft, Non-tender, + BS	  Skin: No rashes, No ecchymoses, No cyanosis  Extremities: Normal range of motion, No clubbing, cyanosis or edema  Vascular: Peripheral pulses palpable 2+ bilaterally  Neurologic: Non-focal  Psychiatry: A & O x 3, Mood & affect appropriate                          9.0    3.47  )-----------( 257      ( 22 Aug 2024 08:07 )             31.1     08-22    139  |  108  |  9   ----------------------------<  91  4.4   |  24  |  1.07    Ca    8.9      22 Aug 2024 08:07  Mg     1.9     08-22

## 2024-08-22 NOTE — PROGRESS NOTE ADULT - PROBLEM SELECTOR PLAN 1
P/w syncopal episode, and reports total of 3 episodes since Oct 2023, all blackout in nature without prodrome.   - Current Tele/EKG: NSR no acute ischemic changes; Trop and BNP negative; no events on tele  - CTH negative  - Covid +; Orthostatics + x2, s/p NS 250cc bolus  - recheck orthostatic VS in AM  - TTE 8/22/24: techincally difficult study, requires Definity to better evaluate; grossly LVSF appears mildly reduced     o consider definity study in AM

## 2024-08-22 NOTE — DIETITIAN INITIAL EVALUATION ADULT - OTHER CALCULATIONS
Dosing weight used to calculate needs as no height entered in chart. Needs adjusted for age, CHF, suspected malnutrition. Fluids per team.

## 2024-08-22 NOTE — DIETITIAN INITIAL EVALUATION ADULT - ADD RECOMMEND
1. Continue with regular diet. Recommend a maximum of Ensure Enlive 3x/day as Ensure not a sole source of nutrition.  2. Encourage pt to meet nutritional needs as able   3. Monitor labs: electrolytes, cmp  4. Monitor weights   5. Pain and bowel regimen per team   6. Will continue to assess/honor food preferences as able

## 2024-08-22 NOTE — DIETITIAN INITIAL EVALUATION ADULT - PROBLEM/PLAN-1
Caller is patient's significant other Glo.    Caller stated that patient is out of his 100mg water pills. The original prescription was written to say 1/4 tablet a day but according to caller, they checked and he is supposed to be taking one tablet a day. Caller stated that a new prescription will need to be written to say to take one tablet. Patient uses [facility] in [place].     Please call Glo when sent over at [phone]   DISPLAY PLAN FREE TEXT

## 2024-08-22 NOTE — PROGRESS NOTE ADULT - PROBLEM SELECTOR PLAN 2
- Currently NSR with HR 80s.   - EP Hx: two [facility]Fl[facility]ations at [facility]; Most recently s/p successful [facility] AFib ablation on 10/10/2023 via groin access.   - AC: F/up FOBT.  If negative, resume Eliquis 5mg PO BID     o pt has not had BM, start Miralax and Senna  - Rate: currently controlled off meds

## 2024-08-22 NOTE — DIETITIAN INITIAL EVALUATION ADULT - PERTINENT LABORATORY DATA
167.64
08-22    139  |  108  |  9   ----------------------------<  91  4.4   |  24  |  1.07    Ca    8.9      22 Aug 2024 08:07  Mg     1.9     08-22    A1C with Estimated Average Glucose Result: 4.8 % (08-21-24 @ 05:30)  A1C with Estimated Average Glucose Result: 5.1 % (10-07-23 @ 11:45)

## 2024-08-22 NOTE — DIETITIAN INITIAL EVALUATION ADULT - CALCULATED FROM (CAL/KG)
Report called to LISET G[facility]mez at [facility]. Pt discharged with belongings. [facility]. No distress noted.   [id number]

## 2024-08-22 NOTE — DIETITIAN INITIAL EVALUATION ADULT - NAME AND PHONE
Airway  Urgency: elective    Date/Time: 6/7/2021 12:48 PM  Airway not difficult    General Information and Staff    Patient location during procedure: OR  Anesthesiologist: Daquan Jhaveri MD  CRNA: Deuce Buchanan CRNA    Indications and Patient Condition  Indications for airway management: airway protection    Preoxygenated: yes  Mask difficulty assessment: 1 - vent by mask    Final Airway Details  Final airway type: endotracheal airway      Successful airway: ETT  Cuffed: yes   Successful intubation technique: direct laryngoscopy  Facilitating devices/methods: cricoid pressure  Endotracheal tube insertion site: oral  Blade: Apoorva  Blade size: 3  ETT size (mm): 7.0  Cormack-Lehane Classification: grade I - full view of glottis  Placement verified by: chest auscultation and capnometry   Inital cuff pressure (cm H2O): 22  Cuff volume (mL): 7  Measured from: lips  ETT/EBT  to lips (cm): 21  Number of attempts at approach: 1              
Bella Guerrero, MS, RDN, CDN  ([id number])

## 2024-08-22 NOTE — PROGRESS NOTE ADULT - PROBLEM SELECTOR PLAN 3
New anemia, Hgb 9.0-9.9 and stable; no clinical signs of bleeding  -F/up FOBT.  -Iron studies: Total iron 20, TIBC 310, transferrin 269, ferritin 9

## 2024-08-22 NOTE — DIETITIAN INITIAL EVALUATION ADULT - PROBLEM SELECTOR PLAN 5
F/up lipid panel; noncompliant with home Crestor since January.    #chronic back pain and arthritic pain  - reports he is prescribed Percocet and Dilaudid in [facility] ER[facility].   - s/p Percocet x1 in ED, none further as d/w MD  - tenderness to L chest wall and L flank on exam but otherwise benign exam   - Lidocaine patch       N: Regular diet as pt refuses DASH/TLC   E: Replete lytes PRN K<4, Mg<2  P: DVT PPX: pending  FOBT; SCDs for now  C: FULL CODE  (Pt confirms Full Code GOC on 8/20/24)  Dispo: Pending workup  Case discussed with Dr. Sharif
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
## 2024-08-22 NOTE — DIETITIAN INITIAL EVALUATION ADULT - PERTINENT MEDS FT
MEDICATIONS  (STANDING):  melatonin 5 milliGRAM(s) Oral at bedtime  polyethylene glycol 3350 17 Gram(s) Oral daily  rosuvastatin 10 milliGRAM(s) Oral at bedtime  spironolactone 25 milliGRAM(s) Oral daily    MEDICATIONS  (PRN):  acetaminophen     Tablet .. 1000 milliGRAM(s) Oral every 6 hours PRN Moderate Pain (4 - 6)  benzonatate 100 milliGRAM(s) Oral three times a day PRN Cough  lidocaine   4% Patch 1 Patch Transdermal daily PRN pain

## 2024-08-22 NOTE — PROGRESS NOTE ADULT - PROBLEM SELECTOR PLAN 5
Total 119, LDL 56, Triglycerides 69, HDL 49  - c/w Crestor 10mg QD        N: Regular diet as pt refuses DASH/TLC   E: Replete lytes PRN K<4, Mg<2  P: DVT PPX: pending  FOBT; SCDs for now  C: FULL CODE  (Pt confirms Full Code GOC on 8/20/24)  Dispo: [facility]  Case discussed with Dr. Sharif

## 2024-08-23 ENCOUNTER — RESULT REVIEW (OUTPATIENT)
Age: 49
End: 2024-08-23

## 2024-08-23 DIAGNOSIS — K59.00 CONSTIPATION, UNSPECIFIED: ICD-10-CM

## 2024-08-23 LAB
ANION GAP SERPL CALC-SCNC: 7 MMOL/L — SIGNIFICANT CHANGE UP (ref 5–17)
BUN SERPL-MCNC: 9 MG/DL — SIGNIFICANT CHANGE UP (ref 7–23)
CALCIUM SERPL-MCNC: 9.1 MG/DL — SIGNIFICANT CHANGE UP (ref 8.4–10.5)
CHLORIDE SERPL-SCNC: 105 MMOL/L — SIGNIFICANT CHANGE UP (ref 96–108)
CO2 SERPL-SCNC: 24 MMOL/L — SIGNIFICANT CHANGE UP (ref 22–31)
CREAT SERPL-MCNC: 1.02 MG/DL — SIGNIFICANT CHANGE UP (ref 0.5–1.3)
EGFR: 89 ML/MIN/1.73M2 — SIGNIFICANT CHANGE UP
GLUCOSE SERPL-MCNC: 90 MG/DL — SIGNIFICANT CHANGE UP (ref 70–99)
HCT VFR BLD CALC: 31 % — LOW (ref 39–50)
HGB BLD-MCNC: 9.3 G/DL — LOW (ref 13–17)
MAGNESIUM SERPL-MCNC: 2.1 MG/DL — SIGNIFICANT CHANGE UP (ref 1.6–2.6)
MCHC RBC-ENTMCNC: 26.2 PG — LOW (ref 27–34)
MCHC RBC-ENTMCNC: 30 GM/DL — LOW (ref 32–36)
MCV RBC AUTO: 87.3 FL — SIGNIFICANT CHANGE UP (ref 80–100)
NRBC # BLD: 0 /100 WBCS — SIGNIFICANT CHANGE UP (ref 0–0)
PLATELET # BLD AUTO: 268 K/UL — SIGNIFICANT CHANGE UP (ref 150–400)
POTASSIUM SERPL-MCNC: 4.4 MMOL/L — SIGNIFICANT CHANGE UP (ref 3.5–5.3)
POTASSIUM SERPL-SCNC: 4.4 MMOL/L — SIGNIFICANT CHANGE UP (ref 3.5–5.3)
RBC # BLD: 3.55 M/UL — LOW (ref 4.2–5.8)
RBC # FLD: 15.3 % — HIGH (ref 10.3–14.5)
SODIUM SERPL-SCNC: 136 MMOL/L — SIGNIFICANT CHANGE UP (ref 135–145)
WBC # BLD: 3.83 K/UL — SIGNIFICANT CHANGE UP (ref 3.8–10.5)
WBC # FLD AUTO: 3.83 K/UL — SIGNIFICANT CHANGE UP (ref 3.8–10.5)

## 2024-08-23 PROCEDURE — 99232 SBSQ HOSP IP/OBS MODERATE 35: CPT

## 2024-08-23 PROCEDURE — 93308 TTE F-UP OR LMTD: CPT | Mod: 26

## 2024-08-23 RX ORDER — POLYETHYLENE GLYCOL 3350 17 G/17G
17 POWDER, FOR SOLUTION ORAL
Refills: 0 | Status: DISCONTINUED | OUTPATIENT
Start: 2024-08-23 | End: 2024-08-25

## 2024-08-23 RX ORDER — ACETAMINOPHEN 325 MG/1
1000 TABLET ORAL ONCE
Refills: 0 | Status: COMPLETED | OUTPATIENT
Start: 2024-08-23 | End: 2024-08-23

## 2024-08-23 RX ORDER — LIDOCAINE/BENZALKONIUM/ALCOHOL
1 SOLUTION, NON-ORAL TOPICAL ONCE
Refills: 0 | Status: COMPLETED | OUTPATIENT
Start: 2024-08-23 | End: 2024-08-23

## 2024-08-23 RX ADMIN — ACETAMINOPHEN 1000 MILLIGRAM(S): 325 TABLET ORAL at 06:15

## 2024-08-23 RX ADMIN — Medication 100 MILLIGRAM(S): at 05:15

## 2024-08-23 RX ADMIN — METOPROLOL TARTRATE 50 MILLIGRAM(S): 100 TABLET ORAL at 05:16

## 2024-08-23 RX ADMIN — ROSUVASTATIN CALCIUM 10 MILLIGRAM(S): 10 TABLET ORAL at 21:31

## 2024-08-23 RX ADMIN — ACETAMINOPHEN 400 MILLIGRAM(S): 325 TABLET ORAL at 13:03

## 2024-08-23 RX ADMIN — Medication 2 TABLET(S): at 21:31

## 2024-08-23 RX ADMIN — POLYETHYLENE GLYCOL 3350 17 GRAM(S): 17 POWDER, FOR SOLUTION ORAL at 12:18

## 2024-08-23 RX ADMIN — ACETAMINOPHEN 1000 MILLIGRAM(S): 325 TABLET ORAL at 05:15

## 2024-08-23 RX ADMIN — SPIRONOLACTONE 25 MILLIGRAM(S): 25 TABLET, FILM COATED ORAL at 05:16

## 2024-08-23 RX ADMIN — Medication 5 MILLIGRAM(S): at 21:30

## 2024-08-23 NOTE — PROGRESS NOTE ADULT - PROBLEM SELECTOR PLAN 3
New anemia, Hgb 9.0-9.9 and stable; no clinical signs of bleeding  -F/up FOBT.  -Iron studies: Total iron 20, TIBC 310, transferrin 269, ferritin 9 New anemia, Hgb 9.0-9.9 and stable; no clinical signs of bleeding. Oct 2023 baseline 13-14/40-43  -F/up FOBT.  -Iron studies: Total iron 20, TIBC 310, transferrin 269, ferritin 9
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
## 2024-08-23 NOTE — PROGRESS NOTE ADULT - PROBLEM SELECTOR PLAN 2
- Currently NSR with HR 80-90s.   - EP Hx: two prior [facility]Flutter ablations at [facility]; Most recently s/p successful [facility] AFib ablation on 10/10/2023 via groin access.   - AC: Pending FOBT, consider resuming Eliquis 5mg PO BID if FOBT is negative     o pt has not had BM on  Miralax and Senna  - Rate: Metoprolol succinate ER 50 mg QD - Currently NSR with HR 80-90s.   - EP Hx: two prior AFlutter ablations at [facility]; Most recently s/p successful [facility] AFib ablation on 10/10/2023 via groin access.   - AC: Pending FOBT, consider resuming Eliquis 5mg PO BID if FOBT is negative     o pt has not had BM on  Miralax 17 grams BID and Senna BID  - Rate: Metoprolol succinate ER 50 mg QD
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
## 2024-08-23 NOTE — PROGRESS NOTE ADULT - SUBJECTIVE AND OBJECTIVE BOX
INTERVAL HISTORY: No acute events overnight    Subjective: Pt is stable and denies any CP, SOB, palpitations, orthopnea, PND, dizziness, fatigue, chills, cough, or melena.   	  MEDICATIONS:  metoprolol succinate ER 50 milliGRAM(s) Oral daily  spironolactone 25 milliGRAM(s) Oral daily      benzonatate 100 milliGRAM(s) Oral three times a day PRN    acetaminophen     Tablet .. 1000 milliGRAM(s) Oral every 6 hours PRN  melatonin 5 milliGRAM(s) Oral at bedtime    polyethylene glycol 3350 17 Gram(s) Oral two times a day  senna 2 Tablet(s) Oral at bedtime    rosuvastatin 10 milliGRAM(s) Oral at bedtime    lidocaine   4% Patch 1 Patch Transdermal daily PRN      REVIEW OF SYSTEMS:  CONSTITUTIONAL: No fever, weight loss, or fatigue  EYES: No eye pain, visual disturbances, or discharge  ENMT:  No difficulty hearing, tinnitus, vertigo; No sinus or throat pain  NECK: No pain or stiffness  RESPIRATORY: No cough, wheezing, chills or hemoptysis; No Shortness of Breath  CARDIOVASCULAR: No chest pain, palpitations, passing out, dizziness, or leg swelling  GASTROINTESTINAL: No abdominal or epigastric pain. No nausea, vomiting, or hematemesis; No diarrhea or constipation. No melena or hematochezia.  GENITOURINARY: No dysuria, frequency, hematuria, or incontinence  NEUROLOGICAL: No headaches, memory loss, loss of strength, numbness, or tremors  SKIN: No itching, burning, rashes, or lesions   LYMPH Nodes: No enlarged glands  ENDOCRINE: No heat or cold intolerance; No hair loss  MUSCULOSKELETAL: No joint pain or swelling; No muscle, back, or extremity pain  PSYCHIATRIC: No depression, anxiety, mood swings, or difficulty sleeping  HEME/LYMPH: No easy bruising, or bleeding gums  ALLERY AND IMMUNOLOGIC: No hives or eczema	    [ ] All others negative	  [ ] Unable to obtain    PHYSICAL EXAM:  T(C): 37.3 (08-23-24 @ 10:04), Max: 37.3 (08-23-24 @ 10:04)  HR: 87 (08-23-24 @ 10:04) (76 - 90)  BP: 123/78 (08-23-24 @ 10:04) (105/59 - 135/95)  RR: 18 (08-23-24 @ 10:04) (18 - 18)  SpO2: 100% (08-23-24 @ 10:04) (98% - 100%)  Wt(kg): --  I&O's Summary    22 Aug 2024 07:01  -  23 Aug 2024 07:00  --------------------------------------------------------  IN: 360 mL / OUT: 500 mL / NET: -140 mL    23 Aug 2024 07:01  -  23 Aug 2024 16:21  --------------------------------------------------------  IN: 670 mL / OUT: 0 mL / NET: 670 mL          Appearance: Normal	  HEENT:   Normal oral mucosa, PERRL, EOMI	  Lymphatic: No lymphadenopathy  Cardiovascular: Normal S1 S2, No JVD, No murmurs, No edema  Respiratory: Lungs clear to auscultation	  Psychiatry: A & O x 3, Mood & affect appropriate  Gastrointestinal:  Soft, Non-tender, + BS	  Skin: No rashes, No ecchymoses, No cyanosis  Neurologic: Non-focal  Extremities: Normal range of motion, No clubbing, cyanosis or edema  Vascular: Peripheral pulses palpable 2+ bilaterally    TELEMETRY: 	    ECG:  	  RADIOLOGY:   DIAGNOSTIC TESTING:  [ ] Echocardiogram:  [ ]  Catheterization:  [ ] Stress Test:    OTHER: 	    LABS:	 	    CARDIAC MARKERS:                                  9.3    3.83  )-----------( 268      ( 23 Aug 2024 07:40 )             31.0     08-23    136  |  105  |  9   ----------------------------<  90  4.4   |  24  |  1.02    Ca    9.1      23 Aug 2024 07:40  Mg     2.1     08-23      proBNP:   Lipid Profile:   HgA1c:   TSH:     ASSESSMENT/PLAN:

## 2024-08-23 NOTE — PROGRESS NOTE ADULT - PROBLEM SELECTOR PLAN 5
Total 119, LDL 56, Triglycerides 69, HDL 49  - c/w Crestor 10mg QD        N: Regular diet as pt refuses DASH/TLC   E: Replete lytes PRN K<4, Mg<2  P: DVT PPX: pending  FOBT; SCDs for now  C: FULL CODE  (Pt confirms Full Code GOC on 8/20/24)  Dispo: [facility]  Case discussed with Dr. Sharif Total 119, LDL 56, Triglycerides 69, HDL 49  - c/w Crestor 10mg QD

## 2024-08-23 NOTE — PROGRESS NOTE ADULT - PROBLEM SELECTOR PLAN 1
P/w syncopal episode, and reports total of 3 episodes since Oct 2023, all blackout in nature without prodrome.   - Tele/EKG: NSR no acute ischemic changes; Trop and BNP negative; no events on tele  - CTH negative  - Covid +; Orthostatics + x2, s/p NS 250cc bolus  - TTE 8/22/24: techincally difficult study, requires Definity to better evaluate; grossly LVSF appears mildly reduced  - Pending TTE with definity. P/w syncopal episode, and reports total of 3 episodes since Oct 2023, all blackout in nature without prodrome.   - Tele/EKG: NSR no acute ischemic changes; Trop and BNP negative; no events on tele  - CTH negative  - Covid +; Orthostatics + x2, s/p NS 250cc bolus  - TTE 8/22/24: techincally difficult study, requires Definity to better evaluate; grossly LVSF appears mildly reduced  - Repeat Echo (8/23/24) showed Normal left ventricular systolic function. Left ventricular ejection   fraction is 55-60%.

## 2024-08-24 LAB
ANION GAP SERPL CALC-SCNC: 7 MMOL/L — SIGNIFICANT CHANGE UP (ref 5–17)
BUN SERPL-MCNC: 11 MG/DL — SIGNIFICANT CHANGE UP (ref 7–23)
CALCIUM SERPL-MCNC: 9 MG/DL — SIGNIFICANT CHANGE UP (ref 8.4–10.5)
CHLORIDE SERPL-SCNC: 104 MMOL/L — SIGNIFICANT CHANGE UP (ref 96–108)
CO2 SERPL-SCNC: 23 MMOL/L — SIGNIFICANT CHANGE UP (ref 22–31)
CREAT SERPL-MCNC: 1 MG/DL — SIGNIFICANT CHANGE UP (ref 0.5–1.3)
EGFR: 91 ML/MIN/1.73M2 — SIGNIFICANT CHANGE UP
GLUCOSE SERPL-MCNC: 83 MG/DL — SIGNIFICANT CHANGE UP (ref 70–99)
HCT VFR BLD CALC: 31 % — LOW (ref 39–50)
HGB BLD-MCNC: 9.3 G/DL — LOW (ref 13–17)
MAGNESIUM SERPL-MCNC: 2.1 MG/DL — SIGNIFICANT CHANGE UP (ref 1.6–2.6)
MCHC RBC-ENTMCNC: 25.5 PG — LOW (ref 27–34)
MCHC RBC-ENTMCNC: 30 GM/DL — LOW (ref 32–36)
MCV RBC AUTO: 84.9 FL — SIGNIFICANT CHANGE UP (ref 80–100)
NRBC # BLD: 0 /100 WBCS — SIGNIFICANT CHANGE UP (ref 0–0)
PLATELET # BLD AUTO: 279 K/UL — SIGNIFICANT CHANGE UP (ref 150–400)
POTASSIUM SERPL-MCNC: 4 MMOL/L — SIGNIFICANT CHANGE UP (ref 3.5–5.3)
POTASSIUM SERPL-SCNC: 4 MMOL/L — SIGNIFICANT CHANGE UP (ref 3.5–5.3)
RBC # BLD: 3.65 M/UL — LOW (ref 4.2–5.8)
RBC # FLD: 15.4 % — HIGH (ref 10.3–14.5)
SARS-COV-2 RNA SPEC QL NAA+PROBE: DETECTED
SODIUM SERPL-SCNC: 134 MMOL/L — LOW (ref 135–145)
WBC # BLD: 3.64 K/UL — LOW (ref 3.8–10.5)
WBC # FLD AUTO: 3.64 K/UL — LOW (ref 3.8–10.5)

## 2024-08-24 PROCEDURE — 99232 SBSQ HOSP IP/OBS MODERATE 35: CPT

## 2024-08-24 PROCEDURE — 74019 RADEX ABDOMEN 2 VIEWS: CPT | Mod: 26

## 2024-08-24 RX ORDER — LACTULOSE 10 G
10 PACKET (EA) ORAL ONCE
Refills: 0 | Status: COMPLETED | OUTPATIENT
Start: 2024-08-24 | End: 2024-08-24

## 2024-08-24 RX ORDER — SODIUM CHLORIDE 9 MG/ML
250 INJECTION INTRAMUSCULAR; INTRAVENOUS; SUBCUTANEOUS ONCE
Refills: 0 | Status: COMPLETED | OUTPATIENT
Start: 2024-08-24 | End: 2024-08-24

## 2024-08-24 RX ADMIN — ACETAMINOPHEN 1000 MILLIGRAM(S): 325 TABLET ORAL at 03:49

## 2024-08-24 RX ADMIN — ACETAMINOPHEN 1000 MILLIGRAM(S): 325 TABLET ORAL at 04:49

## 2024-08-24 RX ADMIN — POLYETHYLENE GLYCOL 3350 17 GRAM(S): 17 POWDER, FOR SOLUTION ORAL at 05:22

## 2024-08-24 RX ADMIN — Medication 5 MILLIGRAM(S): at 05:21

## 2024-08-24 RX ADMIN — POLYETHYLENE GLYCOL 3350 17 GRAM(S): 17 POWDER, FOR SOLUTION ORAL at 18:55

## 2024-08-24 RX ADMIN — Medication 100 MILLIGRAM(S): at 03:49

## 2024-08-24 RX ADMIN — METOPROLOL TARTRATE 50 MILLIGRAM(S): 100 TABLET ORAL at 05:21

## 2024-08-24 RX ADMIN — ACETAMINOPHEN 1000 MILLIGRAM(S): 325 TABLET ORAL at 19:28

## 2024-08-24 RX ADMIN — Medication 10 GRAM(S): at 19:29

## 2024-08-24 RX ADMIN — Medication 100 MILLIGRAM(S): at 14:34

## 2024-08-24 RX ADMIN — Medication 2 TABLET(S): at 21:04

## 2024-08-24 RX ADMIN — SPIRONOLACTONE 25 MILLIGRAM(S): 25 TABLET, FILM COATED ORAL at 05:21

## 2024-08-24 RX ADMIN — SODIUM CHLORIDE 500 MILLILITER(S): 9 INJECTION INTRAMUSCULAR; INTRAVENOUS; SUBCUTANEOUS at 18:54

## 2024-08-24 RX ADMIN — ROSUVASTATIN CALCIUM 10 MILLIGRAM(S): 10 TABLET ORAL at 21:04

## 2024-08-24 RX ADMIN — Medication 5 MILLIGRAM(S): at 21:04

## 2024-08-24 NOTE — PROGRESS NOTE ADULT - PROBLEM SELECTOR PLAN 6
- Pt reports last bowel movement 4 days ago  - Continue Miralax 17 grams BID, Senna BID, Dulcolax       F: None  E: Replete if K<4 or Mag<2  N: DASH Diet  VTEppx: Holding for now i/s/o anemia  CODE: Full  Dispo: Pending FOBT      Case discussed with Dr. Clemente
-Pt reports last bowel movement 4 days ago  - on  Miralax 17 grams BID and Senna BID  - Will perform FOBT once constipation resolves.
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
## 2024-08-24 NOTE — PROGRESS NOTE ADULT - PROBLEM SELECTOR PLAN 3
New anemia, Hgb 9.0-9.9 and stable; no clinical signs of bleeding. Oct 2023 baseline 13-14/40-43  -F/up FOBT  -Iron studies consistent with JOSE A

## 2024-08-24 NOTE — PROGRESS NOTE ADULT - PROBLEM SELECTOR PLAN 2
S/p A.flutter ablation x2 (most recent @ [facility] 10/10/2023)-   - Currently NSR with HR 80-90s.   - AC: Pending FOBT, resume Eliquis 5mg PO BID if FOBT is negative     o pt has not yet had BM  - Rate: Metoprolol succinate ER 50 mg QD

## 2024-08-24 NOTE — PROGRESS NOTE ADULT - PROBLEM SELECTOR PLAN 1
P/w syncopal episode, and reports total of 3 episodes since Oct 2023, all blackout in nature without prodrome.   - Tele/EKG: NSR no acute ischemic changes; Trop and BNP negative; no events on tele  - CTH negative  - Covid +; Orthostatics + x2, s/p NS 250cc bolus  - TTE 8/23/24: LV EF 55-60%, no valvular disease

## 2024-08-24 NOTE — PROGRESS NOTE ADULT - SUBJECTIVE AND OBJECTIVE BOX
Cardiology PA Adult Progress Note    SUBJECTIVE ASSESSMENT: Met with and examined the pt at bedside this morning seen lying comfortably in bed. He denies any acute complaints at this time other than not having had a BM.   	  MEDICATIONS:  metoprolol succinate ER 50 milliGRAM(s) Oral daily  spironolactone 25 milliGRAM(s) Oral daily  benzonatate 100 milliGRAM(s) Oral three times a day PRN  acetaminophen     Tablet .. 1000 milliGRAM(s) Oral every 6 hours PRN  melatonin 5 milliGRAM(s) Oral at bedtime  bisacodyl 5 milliGRAM(s) Oral every 12 hours PRN  polyethylene glycol 3350 17 Gram(s) Oral two times a day  senna 2 Tablet(s) Oral at bedtime  rosuvastatin 10 milliGRAM(s) Oral at bedtime  lidocaine   4% Patch 1 Patch Transdermal daily PRN  sodium chloride 0.9% Bolus 250 milliLiter(s) IV Bolus once    	  VITAL SIGNS:  T(C): 36.8 (08-24-24 @ 05:20), Max: 36.9 (08-23-24 @ 18:34)  HR: 87 (08-24-24 @ 14:00) (69 - 87)  BP: 111/78 (08-24-24 @ 14:00) (111/78 - 129/82)  RR: 18 (08-24-24 @ 14:00) (17 - 18)  SpO2: 100% (08-24-24 @ 14:00) (100% - 100%)  Wt(kg): --    I&O's Summary    23 Aug 2024 07:01  -  24 Aug 2024 07:00  --------------------------------------------------------  IN: 1570 mL / OUT: 0 mL / NET: 1570 mL    24 Aug 2024 07:01  -  24 Aug 2024 17:11  --------------------------------------------------------  IN: 400 mL / OUT: 0 mL / NET: 400 mL                                           PHYSICAL EXAM:  Appearance: Normal	  HEENT: Normal oral mucosa, PERRL, EOMI	  Neck: Supple, - JVD  Cardiovascular: Normal S1 S2, No murmurs  Respiratory: Lungs clear to auscultation. No Rales, Rhonchi, Wheezing	  Gastrointestinal:  Soft, Non-tender, + BS	  Skin: No rashes, No ecchymoses, No cyanosis  Extremities: Normal range of motion, No clubbing, cyanosis or edema  Vascular: Peripheral pulses palpable 2+ bilaterally  Neurologic: Non-focal  Psychiatry: A & O x 3, Mood & affect appropriate    LABS:	 	                          9.3    3.64  )-----------( 279      ( 24 Aug 2024 05:30 )             31.0     08-24    134<L>  |  104  |  11  ----------------------------<  83  4.0   |  23  |  1.00    Ca    9.0      24 Aug 2024 05:30  Mg     2.1     08-24      proBNP:   Lipid Profile:   HgA1c:   TSH:

## 2024-08-24 NOTE — PROGRESS NOTE ADULT - PROBLEM SELECTOR PROBLEM 4
Heart failure with mildly reduced ejection fraction (HFmrEF)

## 2024-08-25 VITALS
OXYGEN SATURATION: 96 % | TEMPERATURE: 98 F | DIASTOLIC BLOOD PRESSURE: 64 MMHG | RESPIRATION RATE: 18 BRPM | SYSTOLIC BLOOD PRESSURE: 120 MMHG | HEART RATE: 92 BPM

## 2024-08-25 LAB
ANION GAP SERPL CALC-SCNC: 5 MMOL/L — SIGNIFICANT CHANGE UP (ref 5–17)
BUN SERPL-MCNC: 11 MG/DL — SIGNIFICANT CHANGE UP (ref 7–23)
CALCIUM SERPL-MCNC: 9.6 MG/DL — SIGNIFICANT CHANGE UP (ref 8.4–10.5)
CHLORIDE SERPL-SCNC: 101 MMOL/L — SIGNIFICANT CHANGE UP (ref 96–108)
CO2 SERPL-SCNC: 27 MMOL/L — SIGNIFICANT CHANGE UP (ref 22–31)
CREAT SERPL-MCNC: 1.01 MG/DL — SIGNIFICANT CHANGE UP (ref 0.5–1.3)
EGFR: 90 ML/MIN/1.73M2 — SIGNIFICANT CHANGE UP
GLUCOSE SERPL-MCNC: 97 MG/DL — SIGNIFICANT CHANGE UP (ref 70–99)
HCT VFR BLD CALC: 35.6 % — LOW (ref 39–50)
HGB BLD-MCNC: 10.8 G/DL — LOW (ref 13–17)
MAGNESIUM SERPL-MCNC: 2 MG/DL — SIGNIFICANT CHANGE UP (ref 1.6–2.6)
MCHC RBC-ENTMCNC: 26.2 PG — LOW (ref 27–34)
MCHC RBC-ENTMCNC: 30.3 GM/DL — LOW (ref 32–36)
MCV RBC AUTO: 86.4 FL — SIGNIFICANT CHANGE UP (ref 80–100)
NRBC # BLD: 0 /100 WBCS — SIGNIFICANT CHANGE UP (ref 0–0)
OB PNL STL: NEGATIVE — SIGNIFICANT CHANGE UP
PLATELET # BLD AUTO: 320 K/UL — SIGNIFICANT CHANGE UP (ref 150–400)
POTASSIUM SERPL-MCNC: 4.3 MMOL/L — SIGNIFICANT CHANGE UP (ref 3.5–5.3)
POTASSIUM SERPL-SCNC: 4.3 MMOL/L — SIGNIFICANT CHANGE UP (ref 3.5–5.3)
RBC # BLD: 4.12 M/UL — LOW (ref 4.2–5.8)
RBC # FLD: 15.4 % — HIGH (ref 10.3–14.5)
SODIUM SERPL-SCNC: 133 MMOL/L — LOW (ref 135–145)
WBC # BLD: 4.43 K/UL — SIGNIFICANT CHANGE UP (ref 3.8–10.5)
WBC # FLD AUTO: 4.43 K/UL — SIGNIFICANT CHANGE UP (ref 3.8–10.5)

## 2024-08-25 PROCEDURE — 85027 COMPLETE CBC AUTOMATED: CPT

## 2024-08-25 PROCEDURE — 85730 THROMBOPLASTIN TIME PARTIAL: CPT

## 2024-08-25 PROCEDURE — 86901 BLOOD TYPING SEROLOGIC RH(D): CPT

## 2024-08-25 PROCEDURE — 84466 ASSAY OF TRANSFERRIN: CPT

## 2024-08-25 PROCEDURE — 84436 ASSAY OF TOTAL THYROXINE: CPT

## 2024-08-25 PROCEDURE — 85025 COMPLETE CBC W/AUTO DIFF WBC: CPT

## 2024-08-25 PROCEDURE — 84484 ASSAY OF TROPONIN QUANT: CPT

## 2024-08-25 PROCEDURE — 83550 IRON BINDING TEST: CPT

## 2024-08-25 PROCEDURE — 80048 BASIC METABOLIC PNL TOTAL CA: CPT

## 2024-08-25 PROCEDURE — 71045 X-RAY EXAM CHEST 1 VIEW: CPT

## 2024-08-25 PROCEDURE — 85610 PROTHROMBIN TIME: CPT

## 2024-08-25 PROCEDURE — 70450 CT HEAD/BRAIN W/O DYE: CPT | Mod: MC

## 2024-08-25 PROCEDURE — 0225U NFCT DS DNA&RNA 21 SARSCOV2: CPT

## 2024-08-25 PROCEDURE — 82272 OCCULT BLD FECES 1-3 TESTS: CPT

## 2024-08-25 PROCEDURE — 99285 EMERGENCY DEPT VISIT HI MDM: CPT | Mod: 25

## 2024-08-25 PROCEDURE — 83880 ASSAY OF NATRIURETIC PEPTIDE: CPT

## 2024-08-25 PROCEDURE — 83735 ASSAY OF MAGNESIUM: CPT

## 2024-08-25 PROCEDURE — 99239 HOSP IP/OBS DSCHRG MGMT >30: CPT

## 2024-08-25 PROCEDURE — 36415 COLL VENOUS BLD VENIPUNCTURE: CPT

## 2024-08-25 PROCEDURE — 80053 COMPREHEN METABOLIC PANEL: CPT

## 2024-08-25 PROCEDURE — 74019 RADEX ABDOMEN 2 VIEWS: CPT

## 2024-08-25 PROCEDURE — 83540 ASSAY OF IRON: CPT

## 2024-08-25 PROCEDURE — 80061 LIPID PANEL: CPT

## 2024-08-25 PROCEDURE — 93306 TTE W/DOPPLER COMPLETE: CPT

## 2024-08-25 PROCEDURE — 86850 RBC ANTIBODY SCREEN: CPT

## 2024-08-25 PROCEDURE — 84443 ASSAY THYROID STIM HORMONE: CPT

## 2024-08-25 PROCEDURE — 83036 HEMOGLOBIN GLYCOSYLATED A1C: CPT

## 2024-08-25 PROCEDURE — 86900 BLOOD TYPING SEROLOGIC ABO: CPT

## 2024-08-25 PROCEDURE — 93005 ELECTROCARDIOGRAM TRACING: CPT

## 2024-08-25 PROCEDURE — 82728 ASSAY OF FERRITIN: CPT

## 2024-08-25 PROCEDURE — 87635 SARS-COV-2 COVID-19 AMP PRB: CPT

## 2024-08-25 PROCEDURE — 81001 URINALYSIS AUTO W/SCOPE: CPT

## 2024-08-25 PROCEDURE — 93308 TTE F-UP OR LMTD: CPT

## 2024-08-25 PROCEDURE — 84480 ASSAY TRIIODOTHYRONINE (T3): CPT

## 2024-08-25 PROCEDURE — 87389 HIV-1 AG W/HIV-1&-2 AB AG IA: CPT

## 2024-08-25 PROCEDURE — C8924: CPT

## 2024-08-25 PROCEDURE — 82962 GLUCOSE BLOOD TEST: CPT

## 2024-08-25 RX ORDER — APIXABAN 5 MG/1
1 TABLET, FILM COATED ORAL
Qty: 60 | Refills: 3
Start: 2024-08-25 | End: 2024-12-22

## 2024-08-25 RX ORDER — METOPROLOL TARTRATE 100 MG/1
1 TABLET ORAL
Qty: 30 | Refills: 3
Start: 2024-08-25 | End: 2024-12-22

## 2024-08-25 RX ORDER — SACUBITRIL AND VALSARTAN 49; 51 MG/1; MG/1
1 TABLET, FILM COATED ORAL
Qty: 60 | Refills: 3
Start: 2024-08-25 | End: 2024-12-22

## 2024-08-25 RX ORDER — SENNA 187 MG
2 TABLET ORAL
Qty: 60 | Refills: 0
Start: 2024-08-25 | End: 2024-09-23

## 2024-08-25 RX ORDER — SPIRONOLACTONE 25 MG/1
1 TABLET, FILM COATED ORAL
Qty: 30 | Refills: 3
Start: 2024-08-25 | End: 2024-12-22

## 2024-08-25 RX ORDER — APIXABAN 5 MG/1
1 TABLET, FILM COATED ORAL
Qty: 60 | Refills: 5
Start: 2024-08-25 | End: 2025-02-20

## 2024-08-25 RX ORDER — DAPAGLIFLOZIN 10 MG/1
1 TABLET, FILM COATED ORAL
Qty: 30 | Refills: 5
Start: 2024-08-25 | End: 2025-02-20

## 2024-08-25 RX ORDER — ROSUVASTATIN CALCIUM 10 MG/1
1 TABLET ORAL
Qty: 30 | Refills: 3
Start: 2024-08-25 | End: 2024-12-22

## 2024-08-25 RX ORDER — FERROUS SULFATE 325(65) MG
325 TABLET ORAL DAILY
Refills: 0 | Status: DISCONTINUED | OUTPATIENT
Start: 2024-08-25 | End: 2024-08-25

## 2024-08-25 RX ORDER — ACETAMINOPHEN 325 MG/1
2 TABLET ORAL
Qty: 0 | Refills: 0 | DISCHARGE
Start: 2024-08-25

## 2024-08-25 RX ORDER — FERROUS SULFATE 325(65) MG
1 TABLET ORAL
Qty: 30 | Refills: 3
Start: 2024-08-25 | End: 2024-12-22

## 2024-08-25 RX ORDER — POLYETHYLENE GLYCOL 3350 17 G/17G
17 POWDER, FOR SOLUTION ORAL
Qty: 1020 | Refills: 0
Start: 2024-08-25 | End: 2024-09-23

## 2024-08-25 RX ORDER — APIXABAN 5 MG/1
5 TABLET, FILM COATED ORAL EVERY 12 HOURS
Refills: 0 | Status: DISCONTINUED | OUTPATIENT
Start: 2024-08-25 | End: 2024-08-25

## 2024-08-25 RX ADMIN — SPIRONOLACTONE 25 MILLIGRAM(S): 25 TABLET, FILM COATED ORAL at 05:12

## 2024-08-25 RX ADMIN — METOPROLOL TARTRATE 50 MILLIGRAM(S): 100 TABLET ORAL at 05:11

## 2024-08-25 RX ADMIN — ACETAMINOPHEN 1000 MILLIGRAM(S): 325 TABLET ORAL at 10:32

## 2024-08-25 RX ADMIN — POLYETHYLENE GLYCOL 3350 17 GRAM(S): 17 POWDER, FOR SOLUTION ORAL at 05:12

## 2024-08-25 RX ADMIN — Medication 325 MILLIGRAM(S): at 10:32

## 2024-08-25 RX ADMIN — APIXABAN 5 MILLIGRAM(S): 5 TABLET, FILM COATED ORAL at 10:32

## 2024-08-25 RX ADMIN — Medication 100 MILLIGRAM(S): at 05:41

## 2024-08-25 NOTE — DISCHARGE NOTE NURSING/CASE MANAGEMENT/SOCIAL WORK - NSCORESITESY/N_GEN_A_CORE_RD
[id number]    Date: 12/16/2019  Start Time: 10:00 AM  End Time: 10:50 AM  Facilitators: Sisi Savage LPC    Focus: A presentation was given on the strengths-based perspective and how this can help individuals in mental health treatment. Discussion included exploration of various individualized strengths and how they can be incorporated into the recovery process. Number in attendance: 9          Attendance: Present    Patient Response: Demonstrated insight, Good eye contact and Interested in topic    Remained attentive and completed assignment. Commented on strengths being understanding and having a love of learning.      Lily Lofton [facility] [place] No

## 2024-08-25 NOTE — DISCHARGE NOTE NURSING/CASE MANAGEMENT/SOCIAL WORK - NSTRANSFERBELONGINGSDISPO_GEN_A_NUR
Pt has had right toe/foot pain for 10 days. The toe hhurts if you touch it as well and is red.  Pt went to urgent care and foot xray was negative and uric acid was negative.  He is concerned for a dvt as he recently [place] from [place].      
Pt refused to comply to belongings check; PCA Malu and PRINCE Russell aware/with patient

## 2024-08-25 NOTE — DISCHARGE NOTE NURSING/CASE MANAGEMENT/SOCIAL WORK - NSDCFUADDAPPT_GEN_ALL_CORE_FT
Please follow up with cardiology 1-2 weeks after discharge. They will call you with the first available appointment. If you don't hear from them - please call [phone] or [phone].

## 2024-08-25 NOTE — DISCHARGE NOTE NURSING/CASE MANAGEMENT/SOCIAL WORK - NSDCPEFALRISK_GEN_ALL_CORE
For information on Fall & Injury Prevention, visit: [email or web address] OR  [email or web address] OR  https://www.cdc.gov/steadi/patient.html

## 2024-08-25 NOTE — DISCHARGE NOTE NURSING/CASE MANAGEMENT/SOCIAL WORK - PATIENT PORTAL LINK FT
You can access the FollowMyHealth Patient Portal offered by [facility] by registering at the following website: [email or web address]. By joining [facility]’s FollowMyHealth portal, you will also be able to view your health information using other applications (apps) compatible with our system.

## 2024-08-30 DIAGNOSIS — I11.0 HYPERTENSIVE HEART DISEASE WITH HEART FAILURE: ICD-10-CM

## 2024-08-30 DIAGNOSIS — U07.1 COVID-19: ICD-10-CM

## 2024-08-30 DIAGNOSIS — M54.9 DORSALGIA, UNSPECIFIED: ICD-10-CM

## 2024-08-30 DIAGNOSIS — I09.9 RHEUMATIC HEART DISEASE, UNSPECIFIED: ICD-10-CM

## 2024-08-30 DIAGNOSIS — D50.9 IRON DEFICIENCY ANEMIA, UNSPECIFIED: ICD-10-CM

## 2024-08-30 DIAGNOSIS — R55 SYNCOPE AND COLLAPSE: ICD-10-CM

## 2024-08-30 DIAGNOSIS — E78.5 HYPERLIPIDEMIA, UNSPECIFIED: ICD-10-CM

## 2024-08-30 DIAGNOSIS — K59.00 CONSTIPATION, UNSPECIFIED: ICD-10-CM

## 2024-08-30 DIAGNOSIS — I48.91 UNSPECIFIED ATRIAL FIBRILLATION: ICD-10-CM

## 2024-08-30 DIAGNOSIS — I50.32 CHRONIC DIASTOLIC (CONGESTIVE) HEART FAILURE: ICD-10-CM

## 2024-08-30 DIAGNOSIS — I95.1 ORTHOSTATIC HYPOTENSION: ICD-10-CM

## 2024-08-30 DIAGNOSIS — G89.29 OTHER CHRONIC PAIN: ICD-10-CM

## 2024-08-30 DIAGNOSIS — Z79.01 LONG TERM (CURRENT) USE OF ANTICOAGULANTS: ICD-10-CM

## 2024-11-07 ENCOUNTER — NON-APPOINTMENT (OUTPATIENT)
Age: 49
End: 2024-11-07

## 2025-07-13 ENCOUNTER — INPATIENT (INPATIENT)
Facility: HOSPITAL | Age: 50
LOS: 2 days | Discharge: LEFT BEFORE DISCHARGE | End: 2025-07-16
Attending: INTERNAL MEDICINE | Admitting: STUDENT IN AN ORGANIZED HEALTH CARE EDUCATION/TRAINING PROGRAM
Payer: COMMERCIAL

## 2025-07-13 VITALS
WEIGHT: 139.99 LBS | OXYGEN SATURATION: 96 % | HEART RATE: 96 BPM | HEIGHT: 67 IN | TEMPERATURE: 99 F | RESPIRATION RATE: 16 BRPM | SYSTOLIC BLOOD PRESSURE: 131 MMHG | DIASTOLIC BLOOD PRESSURE: 91 MMHG

## 2025-07-13 DIAGNOSIS — Z98.890 OTHER SPECIFIED POSTPROCEDURAL STATES: Chronic | ICD-10-CM

## 2025-07-13 DIAGNOSIS — E78.5 HYPERLIPIDEMIA, UNSPECIFIED: ICD-10-CM

## 2025-07-13 DIAGNOSIS — S21.339A PUNCTURE WOUND WITHOUT FOREIGN BODY OF UNSPECIFIED FRONT WALL OF THORAX WITH PENETRATION INTO THORACIC CAVITY, INITIAL ENCOUNTER: Chronic | ICD-10-CM

## 2025-07-13 DIAGNOSIS — I10 ESSENTIAL (PRIMARY) HYPERTENSION: ICD-10-CM

## 2025-07-13 DIAGNOSIS — R63.4 ABNORMAL WEIGHT LOSS: ICD-10-CM

## 2025-07-13 DIAGNOSIS — I50.22 CHRONIC SYSTOLIC (CONGESTIVE) HEART FAILURE: ICD-10-CM

## 2025-07-13 DIAGNOSIS — I48.91 UNSPECIFIED ATRIAL FIBRILLATION: ICD-10-CM

## 2025-07-13 PROBLEM — I48.92 UNSPECIFIED ATRIAL FLUTTER: Chronic | Status: ACTIVE | Noted: 2024-08-20

## 2025-07-13 PROBLEM — M79.5 RESIDUAL FOREIGN BODY IN SOFT TISSUE: Chronic | Status: ACTIVE | Noted: 2024-08-20

## 2025-07-13 LAB
ADD ON TEST-SPECIMEN IN LAB: SIGNIFICANT CHANGE UP
ALBUMIN SERPL ELPH-MCNC: 3.9 G/DL — SIGNIFICANT CHANGE UP (ref 3.3–5)
ALP SERPL-CCNC: 95 U/L — SIGNIFICANT CHANGE UP (ref 40–120)
ALT FLD-CCNC: 12 U/L — SIGNIFICANT CHANGE UP (ref 10–45)
ANION GAP SERPL CALC-SCNC: 12 MMOL/L — SIGNIFICANT CHANGE UP (ref 5–17)
APTT BLD: 35.6 SEC — SIGNIFICANT CHANGE UP (ref 26.1–36.8)
AST SERPL-CCNC: 15 U/L — SIGNIFICANT CHANGE UP (ref 10–40)
BASOPHILS # BLD AUTO: 0.03 K/UL — SIGNIFICANT CHANGE UP (ref 0–0.2)
BASOPHILS NFR BLD AUTO: 0.7 % — SIGNIFICANT CHANGE UP (ref 0–2)
BILIRUB SERPL-MCNC: 0.8 MG/DL — SIGNIFICANT CHANGE UP (ref 0.2–1.2)
BUN SERPL-MCNC: 9 MG/DL — SIGNIFICANT CHANGE UP (ref 7–23)
CALCIUM SERPL-MCNC: 9.3 MG/DL — SIGNIFICANT CHANGE UP (ref 8.4–10.5)
CHLORIDE SERPL-SCNC: 106 MMOL/L — SIGNIFICANT CHANGE UP (ref 96–108)
CK MB CFR SERPL CALC: 1.2 NG/ML — SIGNIFICANT CHANGE UP (ref 0–6.7)
CK SERPL-CCNC: 80 U/L — SIGNIFICANT CHANGE UP (ref 30–200)
CO2 SERPL-SCNC: 20 MMOL/L — LOW (ref 22–31)
CREAT SERPL-MCNC: 0.94 MG/DL — SIGNIFICANT CHANGE UP (ref 0.5–1.3)
EGFR: 98 ML/MIN/1.73M2 — SIGNIFICANT CHANGE UP
EGFR: 98 ML/MIN/1.73M2 — SIGNIFICANT CHANGE UP
EOSINOPHIL # BLD AUTO: 0.1 K/UL — SIGNIFICANT CHANGE UP (ref 0–0.5)
EOSINOPHIL NFR BLD AUTO: 2.4 % — SIGNIFICANT CHANGE UP (ref 0–6)
GLUCOSE SERPL-MCNC: 76 MG/DL — SIGNIFICANT CHANGE UP (ref 70–99)
HCT VFR BLD CALC: 32.8 % — LOW (ref 39–50)
HGB BLD-MCNC: 10.5 G/DL — LOW (ref 13–17)
IMM GRANULOCYTES # BLD AUTO: 0.01 K/UL — SIGNIFICANT CHANGE UP (ref 0–0.07)
IMM GRANULOCYTES NFR BLD AUTO: 0.2 % — SIGNIFICANT CHANGE UP (ref 0–0.9)
INR BLD: 1.06 — SIGNIFICANT CHANGE UP (ref 0.85–1.16)
LYMPHOCYTES # BLD AUTO: 1.06 K/UL — SIGNIFICANT CHANGE UP (ref 1–3.3)
LYMPHOCYTES NFR BLD AUTO: 25.9 % — SIGNIFICANT CHANGE UP (ref 13–44)
MAGNESIUM SERPL-MCNC: 1.8 MG/DL — SIGNIFICANT CHANGE UP (ref 1.6–2.6)
MCHC RBC-ENTMCNC: 31.5 PG — SIGNIFICANT CHANGE UP (ref 27–34)
MCHC RBC-ENTMCNC: 32 G/DL — SIGNIFICANT CHANGE UP (ref 32–36)
MCV RBC AUTO: 98.5 FL — SIGNIFICANT CHANGE UP (ref 80–100)
MONOCYTES # BLD AUTO: 0.42 K/UL — SIGNIFICANT CHANGE UP (ref 0–0.9)
MONOCYTES NFR BLD AUTO: 10.2 % — SIGNIFICANT CHANGE UP (ref 2–14)
NEUTROPHILS # BLD AUTO: 2.48 K/UL — SIGNIFICANT CHANGE UP (ref 1.8–7.4)
NEUTROPHILS NFR BLD AUTO: 60.6 % — SIGNIFICANT CHANGE UP (ref 43–77)
NRBC # BLD AUTO: 0 K/UL — SIGNIFICANT CHANGE UP (ref 0–0)
NRBC # FLD: 0 K/UL — SIGNIFICANT CHANGE UP (ref 0–0)
NRBC BLD AUTO-RTO: 0 /100 WBCS — SIGNIFICANT CHANGE UP (ref 0–0)
PLATELET # BLD AUTO: 198 K/UL — SIGNIFICANT CHANGE UP (ref 150–400)
PMV BLD: 10.1 FL — SIGNIFICANT CHANGE UP (ref 7–13)
POTASSIUM SERPL-MCNC: 4 MMOL/L — SIGNIFICANT CHANGE UP (ref 3.5–5.3)
POTASSIUM SERPL-SCNC: 4 MMOL/L — SIGNIFICANT CHANGE UP (ref 3.5–5.3)
PROT SERPL-MCNC: 6.6 G/DL — SIGNIFICANT CHANGE UP (ref 6–8.3)
PROTHROM AB SERPL-ACNC: 12.2 SEC — SIGNIFICANT CHANGE UP (ref 9.9–13.4)
RBC # BLD: 3.33 M/UL — LOW (ref 4.2–5.8)
RBC # FLD: 12.4 % — SIGNIFICANT CHANGE UP (ref 10.3–14.5)
SODIUM SERPL-SCNC: 138 MMOL/L — SIGNIFICANT CHANGE UP (ref 135–145)
TROPONIN T, HIGH SENSITIVITY RESULT: <6 NG/L — SIGNIFICANT CHANGE UP (ref 0–51)
TROPONIN T, HIGH SENSITIVITY RESULT: <6 NG/L — SIGNIFICANT CHANGE UP (ref 0–51)
WBC # BLD: 4.1 K/UL — SIGNIFICANT CHANGE UP (ref 3.8–10.5)
WBC # FLD AUTO: 4.1 K/UL — SIGNIFICANT CHANGE UP (ref 3.8–10.5)

## 2025-07-13 PROCEDURE — 99223 1ST HOSP IP/OBS HIGH 75: CPT

## 2025-07-13 PROCEDURE — 71045 X-RAY EXAM CHEST 1 VIEW: CPT | Mod: 26

## 2025-07-13 PROCEDURE — 80053 COMPREHEN METABOLIC PANEL: CPT

## 2025-07-13 PROCEDURE — 82553 CREATINE MB FRACTION: CPT

## 2025-07-13 PROCEDURE — 70450 CT HEAD/BRAIN W/O DYE: CPT | Mod: 26

## 2025-07-13 PROCEDURE — 85730 THROMBOPLASTIN TIME PARTIAL: CPT

## 2025-07-13 PROCEDURE — 82550 ASSAY OF CK (CPK): CPT

## 2025-07-13 PROCEDURE — 85025 COMPLETE CBC W/AUTO DIFF WBC: CPT

## 2025-07-13 PROCEDURE — 83735 ASSAY OF MAGNESIUM: CPT

## 2025-07-13 PROCEDURE — 84484 ASSAY OF TROPONIN QUANT: CPT

## 2025-07-13 PROCEDURE — 99285 EMERGENCY DEPT VISIT HI MDM: CPT

## 2025-07-13 PROCEDURE — 85610 PROTHROMBIN TIME: CPT

## 2025-07-13 PROCEDURE — 36415 COLL VENOUS BLD VENIPUNCTURE: CPT

## 2025-07-13 RX ORDER — MELATONIN 5 MG
5 TABLET ORAL AT BEDTIME
Refills: 0 | Status: DISCONTINUED | OUTPATIENT
Start: 2025-07-13 | End: 2025-07-16

## 2025-07-13 RX ORDER — LIDOCAINE HYDROCHLORIDE 20 MG/ML
2 JELLY TOPICAL ONCE
Refills: 0 | Status: COMPLETED | OUTPATIENT
Start: 2025-07-13 | End: 2025-07-13

## 2025-07-13 RX ORDER — MAGNESIUM OXIDE 400 MG
800 TABLET ORAL ONCE
Refills: 0 | Status: COMPLETED | OUTPATIENT
Start: 2025-07-13 | End: 2025-07-13

## 2025-07-13 RX ORDER — DAPAGLIFLOZIN 5 MG/1
10 TABLET, FILM COATED ORAL DAILY
Refills: 0 | Status: DISCONTINUED | OUTPATIENT
Start: 2025-07-13 | End: 2025-07-16

## 2025-07-13 RX ORDER — SPIRONOLACTONE 25 MG
25 TABLET ORAL DAILY
Refills: 0 | Status: DISCONTINUED | OUTPATIENT
Start: 2025-07-13 | End: 2025-07-16

## 2025-07-13 RX ORDER — HYDROMORPHONE/SOD CHLOR,ISO/PF 2 MG/10 ML
0.5 SYRINGE (ML) INJECTION ONCE
Refills: 0 | Status: DISCONTINUED | OUTPATIENT
Start: 2025-07-13 | End: 2025-07-13

## 2025-07-13 RX ORDER — ROSUVASTATIN CALCIUM 20 MG/1
10 TABLET, FILM COATED ORAL AT BEDTIME
Refills: 0 | Status: DISCONTINUED | OUTPATIENT
Start: 2025-07-13 | End: 2025-07-16

## 2025-07-13 RX ORDER — APIXABAN 5 MG/1
5 TABLET, FILM COATED ORAL EVERY 12 HOURS
Refills: 0 | Status: DISCONTINUED | OUTPATIENT
Start: 2025-07-13 | End: 2025-07-16

## 2025-07-13 RX ADMIN — Medication 40 MILLIGRAM(S): at 18:12

## 2025-07-13 RX ADMIN — Medication 5 MILLIGRAM(S): at 21:12

## 2025-07-13 RX ADMIN — ROSUVASTATIN CALCIUM 10 MILLIGRAM(S): 20 TABLET, FILM COATED ORAL at 21:12

## 2025-07-13 RX ADMIN — Medication 0.5 MILLIGRAM(S): at 12:32

## 2025-07-13 RX ADMIN — Medication 25 MILLIGRAM(S): at 18:12

## 2025-07-13 RX ADMIN — DAPAGLIFLOZIN 10 MILLIGRAM(S): 5 TABLET, FILM COATED ORAL at 18:12

## 2025-07-13 RX ADMIN — Medication 800 MILLIGRAM(S): at 18:42

## 2025-07-13 RX ADMIN — APIXABAN 5 MILLIGRAM(S): 5 TABLET, FILM COATED ORAL at 18:13

## 2025-07-13 NOTE — H&P ADULT - PROBLEM SELECTOR PLAN 4
F/u lipid panel AM  - Started atorvastatin 10mg qhs Pt states -25lbs unintentional weight loss in the last two months and endorses night sweats   -Has been drinking ~ 6 ensures daily with no weight gain   -Consider inpatient CT Abd/Pelvis while admitted vs outpatient   - Nutrition consult

## 2025-07-13 NOTE — H&P ADULT - CONVERSATION DETAILS
Goals of Care Discussion  Date of evaluation: 7/13/25  Purpose of discussion: In the setting of advanced age and multiple comorbidities, discussion of patient's wishes was performed should there be any deterioration in health status.   Purpose of discussion: In the event of an emergency situation requiring life-rescuing therapy, it is best to proactively address patient's wishes.   Presentation: as above    Plan:  Code status: Patient is FULL CODE  Emergency Contact/ Fiance: Nahomi Houser, # [phone]

## 2025-07-13 NOTE — H&P ADULT - PROBLEM SELECTOR PLAN 3
TTE 8/23/24: LV EF 55-60%  Pending repeat Echo   Patient self d/c one all medications one month ago   GDMT: Resumed [place] 25mg QD, Farxiga 10mg QD  -Holding BB until repeat TTE to reassess EF   -CXR: No acute infiltrate, Unremarkable cardiac silhouette.        #rheumatic HD  -pt states hx of rheum HD  -Echo reveals no valvular disease  -Pending repeat TTE On exam WWP, euvolemic   Etiology: Unclear no ischemic workup during previous admissions -> NICM vs tachy induced given AFib/AFL  -CXR: No acute infiltrate, Unremarkable cardiac silhouette.  -TTE 8/23/24: LV EF 55-60%  GDMT: Resume previous home meds Valsartan 40mg QD, Coleman 25mg QD, Farxiga 10mg QD  o Holding BB until repeat TTE to reassess EF   - Core measures, cont tele, puslse ox, daily weights           #rheumatic HD  -pt states hx of rheum HD  -Echo reveals no valvular disease  -Pending repeat TTE

## 2025-07-13 NOTE — H&P ADULT - NSHPLABSRESULTS_GEN_ALL_CORE
10.5   4.10  )-----------( 198      ( 13 Jul 2025 12:00 )             32.8   07-13    138  |  106  |  9   ----------------------------<  76  4.0   |  20[L]  |  0.94    Ca    9.3      13 Jul 2025 12:00  Mg     1.8     07-13  TPro  6.6  /  Alb  3.9  /  TBili  0.8  /  DBili  x   /  AST  15  /  ALT  12  /  AlkPhos  95  07-13  PT/INR - ( 13 Jul 2025 11:58 )   PT: 12.2 sec;   INR: 1.06      PTT - ( 13 Jul 2025 11:58 )  PTT:35.6 sec    CARDIAC MARKERS ( 13 Jul 2025 12:00 )  x     / x     / x     / x     / 1.1 ng/mL        Urinalysis Basic - ( 13 Jul 2025 12:00 )    Color: x / Appearance: x / SG: x / pH: x  Gluc: 76 mg/dL / Ketone: x  / Bili: x / Urobili: x   Blood: x / Protein: x / Nitrite: x   Leuk Esterase: x / RBC: x / WBC x   Sq Epi: x / Non Sq Epi: x / Bacteria: x        EKG 7/13/25: NSR w/ LVH no acute changes from previous EKG Aug 2024
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
## 2025-07-13 NOTE — PATIENT PROFILE ADULT - FALL HARM RISK - HARM RISK INTERVENTIONS
Assistance OOB with selected safe patient handling equipment/Communicate Risk of Fall with Harm to all staff/Discuss with provider need for PT consult/Monitor gait and stability/Provide patient with walking aids - walker, cane, crutches/Reinforce activity limits and safety measures with patient and family/Review medications for side effects contributing to fall risk/Tailored Fall Risk Interventions/Visual Cue: Yellow wristband and red socks/Bed in lowest position, wheels locked, appropriate side rails in place/Call bell, personal items and telephone in reach/Instruct patient to call for assistance before getting out of bed or chair/Non-slip footwear when patient is out of bed/[place] to call system/Physically safe environment - no spills, clutter or unnecessary equipment/Purposeful Proactive Rounding/Room/bathroom lighting operational, light cord in reach Assistance OOB with selected safe patient handling equipment/Communicate Risk of Fall with Harm to all staff/Discuss with provider need for PT consult/Monitor gait and stability/Provide patient with walking aids - walker, cane, crutches/Reinforce activity limits and safety measures with patient and family/Tailored Fall Risk Interventions/Visual Cue: Yellow wristband and red socks/Bed in lowest position, wheels locked, appropriate side rails in place/Call bell, personal items and telephone in reach/Instruct patient to call for assistance before getting out of bed or chair/Non-slip footwear when patient is out of bed/[place] to call system/Physically safe environment - no spills, clutter or unnecessary equipment/Purposeful Proactive Rounding/Room/bathroom lighting operational, light cord in reach

## 2025-07-13 NOTE — H&P ADULT - NSICDXFAMILYHX_GEN_ALL_CORE_FT
FAMILY HISTORY:  Father  Still living? Unknown  FH: CAD (coronary artery disease), Age at diagnosis: Age Unknown  FH: CHF (congestive heart failure), Age at diagnosis: Age Unknown    Sibling  Still living? Unknown  FHx: atrial fibrillation, Age at diagnosis: Age Unknown    Grandparent  Still living? Unknown  FHx: stroke, Age at diagnosis: Age Unknown
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
## 2025-07-13 NOTE — H&P ADULT - PROBLEM SELECTOR PLAN 1
Patient admitted post syncopal episode, last episode 8/2024.  -Patient experienced sharp shooting chest pain radiating to back, palpitations, diaphoresis and blurry vision prior to episode.   -Reports head strike   EKG: HR 89bpm, NSR consistent with LVH, No acute changes from EKG in 8/2024, Trop <6   TTE 8/2024 LV EF 55-60%, no valvular disease  - CTH negative  - Pending TTE Patient admitted post syncopal episode, last episode 8/2024.  -Patient experienced sharp shooting chest pain radiating to back, palpitations, diaphoresis and blurry vision prior to episode.   -Reports head strike and LOC for 10-15 seconds  - CTH: no acute pathology   - EKG: HR 89bpm, NSR consistent with LVH, No acute changes from EKG in 8/2024, Trop <6   - TTE 8/2024 LV EF 55-60%, no valvular disease  Plan:   -TTE and orthostatics

## 2025-07-13 NOTE — ED PROVIDER NOTE - CARE PLAN
Principal Discharge DX:	Syncope  Secondary Diagnosis:	Chest pain  Secondary Diagnosis:	Palpitations   1

## 2025-07-13 NOTE — ED PROVIDER NOTE - CLINICAL SUMMARY MEDICAL DECISION MAKING FREE TEXT BOX
51 yo M PMH HTN, HLD, AFIB (multiple ablations, on eliquis), rheumatic heart disease (age 8),prior paralysis from a jump from 5 stories presents for evaluation of syncope, chest pain and palpitations x 3 days.  Patient states that he has noted that his heart will race, he will then have sharp central chest pain and, at times, will then pass out.  Last episode of syncope was PTA when EMS was called.  He was walking on the street when this happened.  He hit the left side of his head and has pain in the lower back and right ankle.  The pains in the lower back and ankle are and exacerbation of prior pain.  No swelling.  No numbness or weakness.  No ha, neck pain.  No cp at present.  Palpitations feels like prior episodes of afib.  No cardiology visits/testing since admission here last year.      DDx:  Afib 49 yo M PMH HTN, HLD, AFIB (multiple ablations, on eliquis), rheumatic heart disease (age 8),prior paralysis from a jump from 5 stories presents for evaluation of syncope, chest pain and palpitations x 3 days.  Patient states that he has noted that his heart will race, he will then have sharp central chest pain and, at times, will then pass out.  Last episode of syncope was PTA when EMS was called.  He was walking on the street when this happened.  He hit the left side of his head and has pain in the lower back and right ankle.  The pains in the lower back and ankle are and exacerbation of prior pain.  No swelling.  No numbness or weakness.  No ha, neck pain.  No cp at present.  Palpitations feels like prior episodes of afib.  No cardiology visits/testing since admission here last year.      DDx:  paroxysmal afib with resultant syncope, less likely ACS.  Will monitor, obtain labs, ekg and d/w cardiology.  Will need CT head to assess for head injury after syncope as he is on eliquis.     14:40: Case s/w Dr. Padilla who will admit.  Patient informed of plan.

## 2025-07-13 NOTE — H&P ADULT - NS ATTEND AMEND GEN_ALL_CORE FT
48 yo ma Afib/flutter s/p ablations    Assessment  1. Syncope preceded by palpitatoins  2. Afib/flutter s/p ablation  3. HF mildly reduced EF    Plan  1. Tele, TTE  2. C/w home dose GDMT  3. If tele unremarkable, will discharge on outpatient ECG monitor

## 2025-07-13 NOTE — H&P ADULT - PROBLEM SELECTOR PLAN 6
Pt states -25lbs unintentional weight loss in the last two months and endorses night sweats   -Has been drinking ~ 6 ensures daily with no weight gain   -Consider inpatient CT abd/pelvic vs outpatient   -Consult nutrition SBPs 130s  - Monitor BPs, c/w meds as above      F: PO   E: Replete for K < 4, Mg <2  N: Regular Diet   DVT PPx :Eliquis     FULL CODE

## 2025-07-13 NOTE — ED ADULT TRIAGE NOTE - RESPIRATORY RATE (BREATHS/MIN)
eMERGENCY dEPARTMENT eNCOUnter      Pt Name: Lucille Petty  MRN: [id number]  [place] 2003  Date of evaluation: 1/1/2020      CHIEF COMPLAINT       Chief Complaint   Patient presents with    Alcohol Intoxication          HISTORY OF PRESENT ILLNESS    Lucille Petty is a 23 y.o. male who presents to the emergency department by squad from home after having too much to drink at home. Patient is alert he is agitated but is responding to verbal commands. REVIEW OF SYSTEMS       Review of Systems   Unable to perform ROS: Mental status change         PAST MEDICAL HISTORY    has a past medical history of ADHD and Anxiety. SURGICAL HISTORY      has no past surgical history on file. CURRENT MEDICATIONS       Previous Medications    CLONIDINE (CATAPRES) 0.3 MG TABLET    Take 0.3 mg by mouth nightly       ALLERGIES     has No Known Allergies. FAMILY HISTORY     has no family status information on file. family history is not on file. SOCIAL HISTORY      reports that he has never smoked. He has never used smokeless tobacco. He reports that he does not drink alcohol or use drugs. PHYSICAL EXAM     INITIAL VITALS:  height is 5' 9\" (1.753 m) and weight is 72.6 kg (160 lb). His oral temperature is 98.1 °F (36.7 °C). His blood pressure is 123/72 and his pulse is 94. His respiration is 18 and oxygen saturation is 99%. Physical Exam  Vitals signs reviewed. Constitutional:       Appearance: Normal appearance. HENT:      Head: Normocephalic and atraumatic. Eyes:      Extraocular Movements: Extraocular movements intact. Pupils: Pupils are equal, round, and reactive to light. Neck:      Musculoskeletal: Normal range of motion. Cardiovascular:      Rate and Rhythm: Normal rate. Pulses: Normal pulses. Pulmonary:      Effort: Pulmonary effort is normal.   Abdominal:      General: Abdomen is flat. Palpations: Abdomen is soft. Musculoskeletal: Normal range of motion.    Skin: General: Skin is warm. Capillary Refill: Capillary refill takes 2 to 3 seconds. Neurological:      General: No focal deficit present. Psychiatric:      Comments: Unable to assess           DIFFERENTIAL DIAGNOSIS/ MDM:     Acute alcohol intoxication, the patient will get an alcohol level, he does have sober companions who are here with him who can take him home. DIAGNOSTIC RESULTS     EKG: All EKG's are interpreted by the Emergency Department Physician who either signs or Co-signs this chart in the absence of a cardiologist.        Not indicated unless otherwise documented above    LABS:  Results for orders placed or performed during the hospital encounter of 01/01/20   Ethanol   Result Value Ref Range    Ethanol 138 (H) <10 mg/dL    Ethanol percent 0.138 (H) <0.010 %       Not indicated unless otherwise documented above    RADIOLOGY:   I reviewed the radiologist interpretations:  No orders to display       Not indicated unless otherwise documented above    EMERGENCY DEPARTMENT COURSE:     The patient was given the following medications:  Orders Placed This Encounter   Medications    ondansetron (ZOFRAN) injection 4 mg    ondansetron (ZOFRAN) 4 MG/2ML injection     Tameka Hinders: cabinet override        Vitals:    Vitals:    01/01/20 0222   BP: 123/72   Pulse: 94   Resp: 18   Temp: 98.1 °F (36.7 °C)   TempSrc: Oral   SpO2: 99%   Weight: 72.6 kg (160 lb)   Height: 5' 9\" (1.753 m)     -------------------------  /72   Pulse 94   Temp 98.1 °F (36.7 °C) (Oral)   Resp 18   Ht 5' 9\" (1.753 m)   Wt 72.6 kg (160 lb)   SpO2 99%   BMI 23.63 kg/m²         I have reviewed the disposition diagnosis with the patient and or their family/guardian. I have answered their questions and given discharge instructions. They voiced understanding of these instructions and did not have any further questions or complaints.     CRITICAL CARE:    None    CONSULTS:    None    PROCEDURES:    None      OARRS Report if indicated             FINAL IMPRESSION      1. Acute alcoholic intoxication without complication [facility])          DISPOSITION/PLAN   DISPOSITION Decision To Discharge    I have reviewed the disposition diagnosis with the patient and or their family/guardian. I have answered their questions and given discharge instructions. They voiced understanding of these instructions and did not have any further questions or complaints. Reevaluation: Patient is sleeping quietly in the emergency department has not had any emesis since he is been here. He is going to be taken home by his mother who is willing to watch him closely until he is sober. Aspiration instructions have been given. Patient is to be sleeping up at a 45 degree angle and she is going to check on him every hour. She  Agrees to these conditions and is willing to take him home.       PATIENT REFERRED TO:  Randal Hathaway MD  [address]  [id number]  [phone]    In 2 days        DISCHARGE MEDICATIONS:  New Prescriptions    No medications on file       (Please note that portions of this note were completed with a voice recognition program.  Efforts were made to edit the dictations but occasionally words are mis-transcribed.)    Wolfe MD  Attending Emergency Physician            Scott Brenner MD  01/01/20 [id number]

## 2025-07-13 NOTE — ED PROVIDER NOTE - PHYSICAL EXAMINATION
General:  Well appearing, no distress  HEENT:  No conjunctival injection, neck supple, no congestion   No scalp tenderness or hematoma  Spine:  No vertebral tenderness   Chest:  Non-tender, no crepitance  Lungs:  Clear to auscultation bilaterally   Heart:  s1s2 normal, no murmur  Abdomen:  soft, non-tender, non-distended  :  Deferred  Rectal:  Deferred  Extremities: No edema, normal perfusion, no joint swelling or tenderness  Neuro:  Alert, conversant, motor/sensory grossly intact

## 2025-07-13 NOTE — PATIENT PROFILE ADULT - FUNCTIONAL ASSESSMENT - BASIC MOBILITY 6.
3-calculated by average/Not able to assess (calculate score using [facility] averaging method)  3-calculated by average/Not able to assess (calculate score using [facility] averaging method)

## 2025-07-13 NOTE — PATIENT PROFILE ADULT - FUNCTIONAL ASSESSMENT - BASIC MOBILITY ASSESSMENT TYPE
Anesthesia Post-op Note    Patient: Moraima Young  Procedure(s) Performed: LABOR ANALGESIA  Anesthesia type: L&D Epidural    Vitals Value Taken Time   Temp 36.6 °C (97.9 °F) 11/02/23 2017   Pulse 80 11/02/23 2017   Resp 16 11/02/23 2017   SpO2 97 % 11/02/23 2017   /77 11/02/23 2017         Patient Location: PACU Phase 1  Post-op Vital Signs:stable  Level of Consciousness: awake and alert  Respiratory Status: spontaneous ventilation  Cardiovascular stable  Hydration: euvolemic  Pain Management: adequately controlled  Handoff: Handoff to receiving clinician was performed and questions were answered  Vomiting: none  Nausea: None  Airway Patency:patent  Post-op Assessment: no complications and patient tolerated procedure well      No notable events documented.   Admission

## 2025-07-13 NOTE — H&P ADULT - PROBLEM SELECTOR PLAN 2
S/p afib/ aflutter ablation [facility] 10/2023  -Currently NSR HR 80s  - Resume Eliquis 5mg BID   - Holding BB until repeat TTE to assess EF S/p afib/ aflutter ablation [facility] 10/2023  -Currently NSR HR 80s   - Rate Control; - Holding BB until repeat TTE to assess EF  - AC: Resume Eliquis 5mg BID   o Patient endorses being off for 1 week

## 2025-07-14 ENCOUNTER — RESULT REVIEW (OUTPATIENT)
Age: 50
End: 2025-07-14

## 2025-07-14 LAB
A1C WITH ESTIMATED AVERAGE GLUCOSE RESULT: 4.3 % — SIGNIFICANT CHANGE UP (ref 4–5.6)
ADD ON TEST-SPECIMEN IN LAB: SIGNIFICANT CHANGE UP
ALBUMIN SERPL ELPH-MCNC: 3.8 G/DL — SIGNIFICANT CHANGE UP (ref 3.3–5)
ALP SERPL-CCNC: 103 U/L — SIGNIFICANT CHANGE UP (ref 40–120)
ALT FLD-CCNC: 15 U/L — SIGNIFICANT CHANGE UP (ref 10–45)
ANION GAP SERPL CALC-SCNC: 10 MMOL/L — SIGNIFICANT CHANGE UP (ref 5–17)
AST SERPL-CCNC: 15 U/L — SIGNIFICANT CHANGE UP (ref 10–40)
BILIRUB SERPL-MCNC: 0.6 MG/DL — SIGNIFICANT CHANGE UP (ref 0.2–1.2)
BUN SERPL-MCNC: 12 MG/DL — SIGNIFICANT CHANGE UP (ref 7–23)
CALCIUM SERPL-MCNC: 9 MG/DL — SIGNIFICANT CHANGE UP (ref 8.4–10.5)
CHLORIDE SERPL-SCNC: 106 MMOL/L — SIGNIFICANT CHANGE UP (ref 96–108)
CHOLEST SERPL-MCNC: 124 MG/DL — SIGNIFICANT CHANGE UP
CO2 SERPL-SCNC: 22 MMOL/L — SIGNIFICANT CHANGE UP (ref 22–31)
CREAT SERPL-MCNC: 0.96 MG/DL — SIGNIFICANT CHANGE UP (ref 0.5–1.3)
EGFR: 95 ML/MIN/1.73M2 — SIGNIFICANT CHANGE UP
EGFR: 95 ML/MIN/1.73M2 — SIGNIFICANT CHANGE UP
ESTIMATED AVERAGE GLUCOSE: 77 MG/DL — SIGNIFICANT CHANGE UP (ref 68–114)
FERRITIN SERPL-MCNC: 26 NG/ML — LOW (ref 30–400)
GLUCOSE SERPL-MCNC: 88 MG/DL — SIGNIFICANT CHANGE UP (ref 70–99)
HCT VFR BLD CALC: 37.7 % — LOW (ref 39–50)
HDLC SERPL-MCNC: 81 MG/DL — SIGNIFICANT CHANGE UP
HGB BLD-MCNC: 11.9 G/DL — LOW (ref 13–17)
IRON SATN MFR SERPL: 15 % — LOW (ref 16–55)
IRON SATN MFR SERPL: 45 UG/DL — SIGNIFICANT CHANGE UP (ref 45–165)
LDLC SERPL-MCNC: 32 MG/DL — SIGNIFICANT CHANGE UP
LIPID PNL WITH DIRECT LDL SERPL: 32 MG/DL — SIGNIFICANT CHANGE UP
MAGNESIUM SERPL-MCNC: 2 MG/DL — SIGNIFICANT CHANGE UP (ref 1.6–2.6)
MCHC RBC-ENTMCNC: 31.2 PG — SIGNIFICANT CHANGE UP (ref 27–34)
MCHC RBC-ENTMCNC: 31.6 G/DL — LOW (ref 32–36)
MCV RBC AUTO: 99 FL — SIGNIFICANT CHANGE UP (ref 80–100)
NONHDLC SERPL-MCNC: 43 MG/DL — SIGNIFICANT CHANGE UP
NRBC # BLD AUTO: 0 K/UL — SIGNIFICANT CHANGE UP (ref 0–0)
NRBC # FLD: 0 K/UL — SIGNIFICANT CHANGE UP (ref 0–0)
NRBC BLD AUTO-RTO: 0 /100 WBCS — SIGNIFICANT CHANGE UP (ref 0–0)
PLATELET # BLD AUTO: 219 K/UL — SIGNIFICANT CHANGE UP (ref 150–400)
PMV BLD: 10.3 FL — SIGNIFICANT CHANGE UP (ref 7–13)
POTASSIUM SERPL-MCNC: 4.2 MMOL/L — SIGNIFICANT CHANGE UP (ref 3.5–5.3)
POTASSIUM SERPL-SCNC: 4.2 MMOL/L — SIGNIFICANT CHANGE UP (ref 3.5–5.3)
PROT SERPL-MCNC: 6.9 G/DL — SIGNIFICANT CHANGE UP (ref 6–8.3)
RBC # BLD: 3.81 M/UL — LOW (ref 4.2–5.8)
RBC # FLD: 12.3 % — SIGNIFICANT CHANGE UP (ref 10.3–14.5)
SODIUM SERPL-SCNC: 138 MMOL/L — SIGNIFICANT CHANGE UP (ref 135–145)
TIBC SERPL-MCNC: 291 UG/DL — SIGNIFICANT CHANGE UP (ref 220–430)
TRANSFERRIN SERPL-MCNC: 239 MG/DL — SIGNIFICANT CHANGE UP (ref 200–360)
TRIGL SERPL-MCNC: 45 MG/DL — SIGNIFICANT CHANGE UP
TSH SERPL-MCNC: 2.56 UIU/ML — SIGNIFICANT CHANGE UP (ref 0.27–4.2)
UIBC SERPL-MCNC: 246 UG/DL — SIGNIFICANT CHANGE UP (ref 110–370)
WBC # BLD: 4.57 K/UL — SIGNIFICANT CHANGE UP (ref 3.8–10.5)
WBC # FLD AUTO: 4.57 K/UL — SIGNIFICANT CHANGE UP (ref 3.8–10.5)

## 2025-07-14 PROCEDURE — 84100 ASSAY OF PHOSPHORUS: CPT

## 2025-07-14 PROCEDURE — 85027 COMPLETE CBC AUTOMATED: CPT

## 2025-07-14 PROCEDURE — 83036 HEMOGLOBIN GLYCOSYLATED A1C: CPT

## 2025-07-14 PROCEDURE — 85610 PROTHROMBIN TIME: CPT

## 2025-07-14 PROCEDURE — 83735 ASSAY OF MAGNESIUM: CPT

## 2025-07-14 PROCEDURE — 82553 CREATINE MB FRACTION: CPT

## 2025-07-14 PROCEDURE — 84484 ASSAY OF TROPONIN QUANT: CPT

## 2025-07-14 PROCEDURE — 80061 LIPID PANEL: CPT

## 2025-07-14 PROCEDURE — 85730 THROMBOPLASTIN TIME PARTIAL: CPT

## 2025-07-14 PROCEDURE — 82728 ASSAY OF FERRITIN: CPT

## 2025-07-14 PROCEDURE — 84466 ASSAY OF TRANSFERRIN: CPT

## 2025-07-14 PROCEDURE — 84443 ASSAY THYROID STIM HORMONE: CPT

## 2025-07-14 PROCEDURE — 99233 SBSQ HOSP IP/OBS HIGH 50: CPT

## 2025-07-14 PROCEDURE — 82550 ASSAY OF CK (CPK): CPT

## 2025-07-14 PROCEDURE — 85025 COMPLETE CBC W/AUTO DIFF WBC: CPT

## 2025-07-14 PROCEDURE — 83540 ASSAY OF IRON: CPT

## 2025-07-14 PROCEDURE — 83550 IRON BINDING TEST: CPT

## 2025-07-14 PROCEDURE — 80053 COMPREHEN METABOLIC PANEL: CPT

## 2025-07-14 PROCEDURE — 36415 COLL VENOUS BLD VENIPUNCTURE: CPT

## 2025-07-14 PROCEDURE — 93306 TTE W/DOPPLER COMPLETE: CPT | Mod: 26

## 2025-07-14 RX ORDER — LIDOCAINE HYDROCHLORIDE 20 MG/ML
1 JELLY TOPICAL ONCE
Refills: 0 | Status: COMPLETED | OUTPATIENT
Start: 2025-07-14 | End: 2025-07-14

## 2025-07-14 RX ORDER — MAGNESIUM, ALUMINUM HYDROXIDE 200-200 MG
30 TABLET,CHEWABLE ORAL EVERY 4 HOURS
Refills: 0 | Status: DISCONTINUED | OUTPATIENT
Start: 2025-07-14 | End: 2025-07-16

## 2025-07-14 RX ORDER — ACETAMINOPHEN 500 MG/5ML
650 LIQUID (ML) ORAL EVERY 6 HOURS
Refills: 0 | Status: DISCONTINUED | OUTPATIENT
Start: 2025-07-14 | End: 2025-07-16

## 2025-07-14 RX ORDER — B1/B2/B3/B5/B6/B12/VIT C/FOLIC 500-0.5 MG
1 TABLET ORAL DAILY
Refills: 0 | Status: DISCONTINUED | OUTPATIENT
Start: 2025-07-14 | End: 2025-07-16

## 2025-07-14 RX ADMIN — Medication 650 MILLIGRAM(S): at 20:23

## 2025-07-14 RX ADMIN — Medication 5 MILLIGRAM(S): at 20:23

## 2025-07-14 RX ADMIN — APIXABAN 5 MILLIGRAM(S): 5 TABLET, FILM COATED ORAL at 05:18

## 2025-07-14 RX ADMIN — LIDOCAINE HYDROCHLORIDE 1 PATCH: 20 JELLY TOPICAL at 19:31

## 2025-07-14 RX ADMIN — Medication 650 MILLIGRAM(S): at 13:30

## 2025-07-14 RX ADMIN — LIDOCAINE HYDROCHLORIDE 1 PATCH: 20 JELLY TOPICAL at 21:50

## 2025-07-14 RX ADMIN — LIDOCAINE HYDROCHLORIDE 1 PATCH: 20 JELLY TOPICAL at 07:52

## 2025-07-14 RX ADMIN — ROSUVASTATIN CALCIUM 10 MILLIGRAM(S): 20 TABLET, FILM COATED ORAL at 20:23

## 2025-07-14 RX ADMIN — Medication 650 MILLIGRAM(S): at 21:23

## 2025-07-14 RX ADMIN — DAPAGLIFLOZIN 10 MILLIGRAM(S): 5 TABLET, FILM COATED ORAL at 12:38

## 2025-07-14 RX ADMIN — Medication 1 TABLET(S): at 15:57

## 2025-07-14 RX ADMIN — Medication 650 MILLIGRAM(S): at 06:57

## 2025-07-14 RX ADMIN — Medication 25 MILLIGRAM(S): at 16:09

## 2025-07-14 RX ADMIN — Medication 650 MILLIGRAM(S): at 12:37

## 2025-07-14 RX ADMIN — Medication 30 MILLILITER(S): at 16:12

## 2025-07-14 RX ADMIN — Medication 40 MILLIGRAM(S): at 16:09

## 2025-07-14 RX ADMIN — Medication 650 MILLIGRAM(S): at 07:30

## 2025-07-14 RX ADMIN — APIXABAN 5 MILLIGRAM(S): 5 TABLET, FILM COATED ORAL at 19:22
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
## 2025-07-14 NOTE — PROGRESS NOTE ADULT - PROBLEM SELECTOR PLAN 3
On exam WWP, euvolemic   Etiology: Unclear no ischemic workup during previous admissions -> NICM vs tachy induced given AFib/AFL  -CXR: No acute infiltrate, Unremarkable cardiac silhouette.  -TTE 8/23/24: LV EF 55-60%  GDMT: Resume previous home meds Valsartan 40mg QD, Coleman 25mg QD, Farxiga 10mg QD  o Holding BB until repeat TTE to reassess EF   - Core measures, cont tele, puslse ox, daily weights           #rheumatic HD  -pt states hx of rheum HD  -Echo reveals no valvular disease  -Pending repeat TTE

## 2025-07-14 NOTE — DIETITIAN INITIAL EVALUATION ADULT - PROBLEM SELECTOR PLAN 2
Despite some risk factors, the patient does not demonstrate definitive evidence of glaucoma at this time. S/p afib/ aflutter ablation [facility] 10/2023  -Currently NSR HR 80s   - Rate Control; - Holding BB until repeat TTE to assess EF  - AC: Resume Eliquis 5mg BID   o Patient endorses being off for 1 week

## 2025-07-14 NOTE — DIETITIAN INITIAL EVALUATION ADULT - OTHER CALCULATIONS
pounds +-10%; %IBW=95  Current body wt used for energy calculations as pt falls within % IBW  Adjust for age and current medical issues; fluids per team d/t HF hx

## 2025-07-14 NOTE — PROGRESS NOTE ADULT - PROBLEM SELECTOR PLAN 2
S/p afib/ aflutter ablation [facility] 10/2023  -Currently NSR HR 80s   - Rate Control; - Holding BB until repeat TTE to assess EF  - AC: Resume Eliquis 5mg BID   o Patient endorses being off for 1 week

## 2025-07-14 NOTE — DIETITIAN INITIAL EVALUATION ADULT - PROBLEM SELECTOR PLAN 1
Patient admitted post syncopal episode, last episode 8/2024.  -Patient experienced sharp shooting chest pain radiating to back, palpitations, diaphoresis and blurry vision prior to episode.   -Reports head strike and LOC for 10-15 seconds  - CTH: no acute pathology   - EKG: HR 89bpm, NSR consistent with LVH, No acute changes from EKG in 8/2024, Trop <6   - TTE 8/2024 LV EF 55-60%, no valvular disease  Plan:   -TTE and orthostatics
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
## 2025-07-14 NOTE — DIETITIAN INITIAL EVALUATION ADULT - PROBLEM SELECTOR PLAN 4
Pt states -25lbs unintentional weight loss in the last two months and endorses night sweats   -Has been drinking ~ 6 ensures daily with no weight gain   -Consider inpatient CT Abd/Pelvis while admitted vs outpatient   - Nutrition consult The patient is a 10y Female complaining of

## 2025-07-14 NOTE — DIETITIAN INITIAL EVALUATION ADULT - PROBLEM SELECTOR PLAN 6
SBPs 130s  - Monitor BPs, c/w meds as above      F: PO   E: Replete for K < 4, Mg <2  N: Regular Diet   DVT PPx :Eliquis     FULL CODE

## 2025-07-14 NOTE — PROGRESS NOTE ADULT - PROBLEM SELECTOR PLAN 6
SBPs 120s- 130s  - Monitor BPs, c/w meds as above      F: PO   E: Replete for K < 4, Mg <2  N: Regular Diet   DVT PPx :Eliquis     FULL CODE

## 2025-07-14 NOTE — DIETITIAN INITIAL EVALUATION ADULT - PROBLEM SELECTOR PROBLEM 2
Patient was seen in the office yesterday 12/4/2019 will be scheduled for right and left heart cath as well as a MARLIN Sally in the valve clinic filled out the OT I. I am putting in the orders for blood work. JAYLA form placed on [facility] desk by Sally PEREZ today.    Atrial fibrillation and flutter

## 2025-07-14 NOTE — DIETITIAN INITIAL EVALUATION ADULT - PERTINENT MEDS FT
MEDICATIONS  (STANDING):  apixaban 5 milliGRAM(s) Oral every 12 hours  dapagliflozin 10 milliGRAM(s) Oral daily  melatonin 5 milliGRAM(s) Oral at bedtime  rosuvastatin 10 milliGRAM(s) Oral at bedtime  spironolactone 25 milliGRAM(s) Oral daily  valsartan 40 milliGRAM(s) Oral daily    MEDICATIONS  (PRN):  acetaminophen     Tablet .. 650 milliGRAM(s) Oral every 6 hours PRN Moderate Pain (4 - 6)

## 2025-07-14 NOTE — PROGRESS NOTE ADULT - SUBJECTIVE AND OBJECTIVE BOX
Cardiology PA Adult Progress Note    SUBJECTIVE ASSESSMENT: Patient seen and examined at bedside, resting comfortably in bed. C/o reproducible left chest pain and lower back pain. Overnight no acute events; Currently denies dizziness, palpitations, SOB, dyspnea, coughing, headaches, N/V/D/abdominal pain. Patient understands plan for the day.    	  MEDICATIONS:  spironolactone 25 milliGRAM(s) Oral daily  valsartan 40 milliGRAM(s) Oral daily  acetaminophen     Tablet .. 650 milliGRAM(s) Oral every 6 hours PRN  melatonin 5 milliGRAM(s) Oral at bedtime  dapagliflozin 10 milliGRAM(s) Oral daily  rosuvastatin 10 milliGRAM(s) Oral at bedtime  apixaban 5 milliGRAM(s) Oral every 12 hours  multivitamin 1 Tablet(s) Oral daily    	  VITAL SIGNS:  T(C): 36.8 (07-14-25 @ 05:18), Max: 36.8 (07-14-25 @ 05:18)  HR: 93 (07-14-25 @ 08:39) (87 - 94)  BP: 135/88 (07-14-25 @ 08:39) (109/67 - 135/88)  RR: 18 (07-14-25 @ 08:39) (16 - 18)  SpO2: 100% (07-14-25 @ 05:18) (100% - 100%)  Wt(kg): --    I&O's Summary    13 Jul 2025 07:01  -  14 Jul 2025 07:00  --------------------------------------------------------  IN: 50 mL / OUT: 0 mL / NET: 50 mL    14 Jul 2025 07:01  -  14 Jul 2025 14:02  --------------------------------------------------------  IN: 480 mL / OUT: 1 mL / NET: 479 mL      Height (cm): 170.2 (07-13 @ 18:34)  Weight (kg): 63.5 (07-13 @ 18:34)  BMI (kg/m2): 21.9 (07-13 @ 18:34)  BSA (m2): 1.74 (07-13 @ 18:34)                                     PHYSICAL EXAM:  Appearance: Normal, NAD  HEENT: Normal oral mucosa, PERRL, EOMI	  Neck: Supple, -JVD  Cardiovascular: Normal S1 S2, No murmurs  Respiratory: Lungs clear to auscultation, No Rales, Rhonchi, Wheezing	  Gastrointestinal:  Soft, Non-tender, + BS	  Skin: No rashes, No ecchymoses, No cyanosis  Extremities: Normal range of motion, No clubbing, cyanosis or edema, no tenderness to palpation to lower back   Vascular: Peripheral pulses palpable 2+ bilaterally  Neurologic: Non-focal  Psychiatry: A & O x 3, Mood & affect appropriate    LABS:	 	                          11.9   4.57  )-----------( 219      ( 14 Jul 2025 06:25 )             37.7     07-14    138  |  106  |  12  ----------------------------<  88  4.2   |  22  |  0.96    Ca    9.0      14 Jul 2025 06:25  Mg     2.0     07-14    TPro  6.9  /  Alb  3.8  /  TBili  0.6  /  DBili  x   /  AST  15  /  ALT  15  /  AlkPhos  103  07-14    proBNP:   Lipid Profile:   HgA1c:   TSH: Thyroid Stimulating Hormone, Serum: 2.560 uIU/mL (07-14 @ 06:25)    PT/INR - ( 13 Jul 2025 11:58 )   PT: 12.2 sec;   INR: 1.06          PTT - ( 13 Jul 2025 11:58 )  PTT:35.6 sec

## 2025-07-14 NOTE — PROGRESS NOTE ADULT - PROBLEM SELECTOR PLAN 1
Patient admitted post syncopal episode, last episode 8/2024.  -Patient experienced sharp shooting chest pain radiating to back, palpitations, diaphoresis and blurry vision prior to episode.   -Reports head strike and LOC for 10-15 seconds  - CTH: no acute pathology   - EKG: HR 89bpm, NSR consistent with LVH, No acute changes from EKG in 8/2024, Trop <6   - TTE 8/2024 LV EF 55-60%, no valvular disease  Plan:   -TTE 7/14 f/u  -Patient not tolerating orthostatics at this time, states too dizzy to stand.   -Plan for nuclear stress test in AM--> NPO at midnight

## 2025-07-14 NOTE — DIETITIAN INITIAL EVALUATION ADULT - PERTINENT LABORATORY DATA
07-14    138  |  106  |  12  ----------------------------<  88  4.2   |  22  |  0.96    Ca    9.0      14 Jul 2025 06:25  Mg     2.0     07-14    TPro  6.9  /  Alb  3.8  /  TBili  0.6  /  DBili  x   /  AST  15  /  ALT  15  /  AlkPhos  103  07-14  A1C with Estimated Average Glucose Result: 4.3 % (07-14-25 @ 06:25)  A1C with Estimated Average Glucose Result: 4.8 % (08-21-24 @ 05:30)

## 2025-07-14 NOTE — DIETITIAN INITIAL EVALUATION ADULT - ADD RECOMMEND
1. Current Diet: Regular with ensure plus 2 cans x3/day.  - Continue per pt request.   - Monitor %PO intake, diet tolerance.   - As able, [place] pt food preferences.  2. Pain/GI per team. Monitor Skin, Wt, Labs.  3. MVI.  4. RD to remain available for additional nutrition interventions as needed.   1. Current Diet: Regular with ensure plus 2 cans x3/day.  - Would recommend Low sodium diet. Continue with current oral nutrition supplement order per pt request.   - Monitor %PO intake, diet tolerance.   - As able, [place] pt food preferences.  2. Pain/GI per team. Monitor Skin, Wt, Labs.  3. MVI.  4. RD to remain available for additional nutrition interventions as needed.

## 2025-07-15 VITALS
RESPIRATION RATE: 18 BRPM | SYSTOLIC BLOOD PRESSURE: 113 MMHG | HEART RATE: 97 BPM | TEMPERATURE: 98 F | DIASTOLIC BLOOD PRESSURE: 69 MMHG | OXYGEN SATURATION: 100 %

## 2025-07-15 DIAGNOSIS — D50.9 IRON DEFICIENCY ANEMIA, UNSPECIFIED: ICD-10-CM

## 2025-07-15 LAB
ANION GAP SERPL CALC-SCNC: 9 MMOL/L — SIGNIFICANT CHANGE UP (ref 5–17)
BUN SERPL-MCNC: 11 MG/DL — SIGNIFICANT CHANGE UP (ref 7–23)
CALCIUM SERPL-MCNC: 8.8 MG/DL — SIGNIFICANT CHANGE UP (ref 8.4–10.5)
CHLORIDE SERPL-SCNC: 101 MMOL/L — SIGNIFICANT CHANGE UP (ref 96–108)
CO2 SERPL-SCNC: 25 MMOL/L — SIGNIFICANT CHANGE UP (ref 22–31)
CREAT SERPL-MCNC: 0.89 MG/DL — SIGNIFICANT CHANGE UP (ref 0.5–1.3)
EGFR: 103 ML/MIN/1.73M2 — SIGNIFICANT CHANGE UP
EGFR: 103 ML/MIN/1.73M2 — SIGNIFICANT CHANGE UP
GLUCOSE SERPL-MCNC: 90 MG/DL — SIGNIFICANT CHANGE UP (ref 70–99)
HCT VFR BLD CALC: 35 % — LOW (ref 39–50)
HGB BLD-MCNC: 11.3 G/DL — LOW (ref 13–17)
MAGNESIUM SERPL-MCNC: 2.1 MG/DL — SIGNIFICANT CHANGE UP (ref 1.6–2.6)
MCHC RBC-ENTMCNC: 31.2 PG — SIGNIFICANT CHANGE UP (ref 27–34)
MCHC RBC-ENTMCNC: 32.3 G/DL — SIGNIFICANT CHANGE UP (ref 32–36)
MCV RBC AUTO: 96.7 FL — SIGNIFICANT CHANGE UP (ref 80–100)
NRBC # BLD AUTO: 0 K/UL — SIGNIFICANT CHANGE UP (ref 0–0)
NRBC # FLD: 0 K/UL — SIGNIFICANT CHANGE UP (ref 0–0)
NRBC BLD AUTO-RTO: 0 /100 WBCS — SIGNIFICANT CHANGE UP (ref 0–0)
PLATELET # BLD AUTO: 191 K/UL — SIGNIFICANT CHANGE UP (ref 150–400)
PMV BLD: 10.8 FL — SIGNIFICANT CHANGE UP (ref 7–13)
POTASSIUM SERPL-MCNC: 4.2 MMOL/L — SIGNIFICANT CHANGE UP (ref 3.5–5.3)
POTASSIUM SERPL-SCNC: 4.2 MMOL/L — SIGNIFICANT CHANGE UP (ref 3.5–5.3)
RBC # BLD: 3.62 M/UL — LOW (ref 4.2–5.8)
RBC # FLD: 12.3 % — SIGNIFICANT CHANGE UP (ref 10.3–14.5)
SODIUM SERPL-SCNC: 135 MMOL/L — SIGNIFICANT CHANGE UP (ref 135–145)
WBC # BLD: 3.91 K/UL — SIGNIFICANT CHANGE UP (ref 3.8–10.5)
WBC # FLD AUTO: 3.91 K/UL — SIGNIFICANT CHANGE UP (ref 3.8–10.5)

## 2025-07-15 PROCEDURE — 82550 ASSAY OF CK (CPK): CPT

## 2025-07-15 PROCEDURE — 82728 ASSAY OF FERRITIN: CPT

## 2025-07-15 PROCEDURE — 82553 CREATINE MB FRACTION: CPT

## 2025-07-15 PROCEDURE — 84443 ASSAY THYROID STIM HORMONE: CPT

## 2025-07-15 PROCEDURE — 99232 SBSQ HOSP IP/OBS MODERATE 35: CPT

## 2025-07-15 PROCEDURE — 80048 BASIC METABOLIC PNL TOTAL CA: CPT

## 2025-07-15 PROCEDURE — 99285 EMERGENCY DEPT VISIT HI MDM: CPT

## 2025-07-15 PROCEDURE — 85027 COMPLETE CBC AUTOMATED: CPT

## 2025-07-15 PROCEDURE — 80061 LIPID PANEL: CPT

## 2025-07-15 PROCEDURE — 96374 THER/PROPH/DIAG INJ IV PUSH: CPT

## 2025-07-15 PROCEDURE — 71045 X-RAY EXAM CHEST 1 VIEW: CPT

## 2025-07-15 PROCEDURE — 83550 IRON BINDING TEST: CPT

## 2025-07-15 PROCEDURE — 84484 ASSAY OF TROPONIN QUANT: CPT

## 2025-07-15 PROCEDURE — 84466 ASSAY OF TRANSFERRIN: CPT

## 2025-07-15 PROCEDURE — 83540 ASSAY OF IRON: CPT

## 2025-07-15 PROCEDURE — 70450 CT HEAD/BRAIN W/O DYE: CPT

## 2025-07-15 PROCEDURE — 36415 COLL VENOUS BLD VENIPUNCTURE: CPT

## 2025-07-15 PROCEDURE — 85610 PROTHROMBIN TIME: CPT

## 2025-07-15 PROCEDURE — 83735 ASSAY OF MAGNESIUM: CPT

## 2025-07-15 PROCEDURE — 83036 HEMOGLOBIN GLYCOSYLATED A1C: CPT

## 2025-07-15 PROCEDURE — 93306 TTE W/DOPPLER COMPLETE: CPT

## 2025-07-15 PROCEDURE — 85025 COMPLETE CBC W/AUTO DIFF WBC: CPT

## 2025-07-15 PROCEDURE — 80053 COMPREHEN METABOLIC PANEL: CPT

## 2025-07-15 PROCEDURE — 84100 ASSAY OF PHOSPHORUS: CPT

## 2025-07-15 PROCEDURE — 85730 THROMBOPLASTIN TIME PARTIAL: CPT

## 2025-07-15 RX ORDER — ACETAMINOPHEN 500 MG/5ML
1000 LIQUID (ML) ORAL ONCE
Refills: 0 | Status: COMPLETED | OUTPATIENT
Start: 2025-07-15 | End: 2025-07-15

## 2025-07-15 RX ORDER — LIDOCAINE HYDROCHLORIDE 20 MG/ML
1 JELLY TOPICAL ONCE
Refills: 0 | Status: COMPLETED | OUTPATIENT
Start: 2025-07-15 | End: 2025-07-15

## 2025-07-15 RX ORDER — IRON SUCROSE 20 MG/ML
300 INJECTION, SOLUTION INTRAVENOUS EVERY 24 HOURS
Refills: 0 | Status: DISCONTINUED | OUTPATIENT
Start: 2025-07-15 | End: 2025-07-16

## 2025-07-15 RX ADMIN — DAPAGLIFLOZIN 10 MILLIGRAM(S): 5 TABLET, FILM COATED ORAL at 12:02

## 2025-07-15 RX ADMIN — APIXABAN 5 MILLIGRAM(S): 5 TABLET, FILM COATED ORAL at 18:48

## 2025-07-15 RX ADMIN — APIXABAN 5 MILLIGRAM(S): 5 TABLET, FILM COATED ORAL at 06:04

## 2025-07-15 RX ADMIN — IRON SUCROSE 176.67 MILLIGRAM(S): 20 INJECTION, SOLUTION INTRAVENOUS at 15:24

## 2025-07-15 RX ADMIN — Medication 1000 MILLIGRAM(S): at 06:00

## 2025-07-15 RX ADMIN — Medication 5 MILLIGRAM(S): at 21:41

## 2025-07-15 RX ADMIN — LIDOCAINE HYDROCHLORIDE 1 PATCH: 20 JELLY TOPICAL at 06:07

## 2025-07-15 RX ADMIN — LIDOCAINE HYDROCHLORIDE 1 PATCH: 20 JELLY TOPICAL at 21:41

## 2025-07-15 RX ADMIN — Medication 400 MILLIGRAM(S): at 21:49

## 2025-07-15 RX ADMIN — Medication 1 TABLET(S): at 12:02

## 2025-07-15 RX ADMIN — Medication 25 MILLIGRAM(S): at 16:10

## 2025-07-15 RX ADMIN — Medication 30 MILLILITER(S): at 05:18

## 2025-07-15 RX ADMIN — Medication 400 MILLIGRAM(S): at 05:41

## 2025-07-15 RX ADMIN — ROSUVASTATIN CALCIUM 10 MILLIGRAM(S): 20 TABLET, FILM COATED ORAL at 21:41

## 2025-07-15 RX ADMIN — Medication 1000 MILLIGRAM(S): at 22:00

## 2025-07-15 RX ADMIN — Medication 40 MILLIGRAM(S): at 16:10

## 2025-07-15 RX ADMIN — LIDOCAINE HYDROCHLORIDE 1 PATCH: 20 JELLY TOPICAL at 10:38

## 2025-07-15 NOTE — PROGRESS NOTE ADULT - PROBLEM SELECTOR PLAN 3
On exam WWP, euvolemic   Etiology: Unclear no ischemic workup during previous admissions -> NICM vs tachy induced given AFib/AFL  -CXR: No acute infiltrate, Unremarkable cardiac silhouette.  -TTE 8/23/24: LV EF 55-60%  TTE 7/14 EF 55% Normal LV &RV function   GDMT: Continue Valsartan 40mg QD, [place] 25mg QD, Farxiga 10mg QD  o Holding BB until repeat TTE to reassess EF   - Core measures, cont tele, pulse ox, daily weights           #rheumatic HD  -pt states hx of rheum HD  -Echo reveals no valvular disease  -Pending repeat TTE On exam WWP, euvolemic   Etiology: Unclear no ischemic workup during previous admissions -> NICM vs tachy induced given AFib/AFL  -CXR: No acute infiltrate, Unremarkable cardiac silhouette.  - TTE 8/2024 LV EF 55-60%, no valvular disease. Repeat TTE as above  -GDMT: Continue Valsartan 40 mg QD, Coleman 25 mg QD, Farxiga 10 mg QD     o Held BB, will reintroduce as able    #Rheumatic HD  -pt states hx of rheum HD  -Echo reveals no valvular disease  -Pending repeat TTE
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
## 2025-07-15 NOTE — PROGRESS NOTE ADULT - PROBLEM SELECTOR PLAN 2
S/p afib/ aflutter ablation [facility] 10/2023  -Currently NSR HR 80s   - Rate Control; - Holding BB until repeat TTE to assess EF  - AC: Continue Eliquis 5mg BID   o Patient endorses being off for 1 week S/p afib/ aflutter ablation [facility] 10/2023  - Currently NSR 80s   - Rate Control: Holding BB until repeat TTE to assess EF  - AC: Continue Eliquis 5mg BID   o Patient endorses being off for 1 week
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
## 2025-07-15 NOTE — PROGRESS NOTE ADULT - SUBJECTIVE AND OBJECTIVE BOX
Cardiology PA Adult Progress Note    SUBJECTIVE ASSESSMENT: Patient seen and examined at bedside, doing well today. No acute events overnight. Currently denies CP, dizziness, palpitations, SOB, dyspnea, coughing, headaches, N/V/D/abdominal pain. Patient understands plan for the day.    	  MEDICATIONS:  spironolactone 25 milliGRAM(s) Oral daily  valsartan 40 milliGRAM(s) Oral daily  acetaminophen     Tablet .. 650 milliGRAM(s) Oral every 6 hours PRN  melatonin 5 milliGRAM(s) Oral at bedtime  aluminum hydroxide/magnesium hydroxide/simethicone Suspension 30 milliLiter(s) Oral every 4 hours PRN  dapagliflozin 10 milliGRAM(s) Oral daily  rosuvastatin 10 milliGRAM(s) Oral at bedtime  apixaban 5 milliGRAM(s) Oral every 12 hours  iron sucrose IVPB 300 milliGRAM(s) IV Intermittent every 24 hours  multivitamin 1 Tablet(s) Oral daily    	  VITAL SIGNS:  T(C): 36.6 (07-15-25 @ 09:15), Max: 36.7 (07-14-25 @ 15:39)  HR: 82 (07-15-25 @ 09:15) (75 - 87)  BP: 126/79 (07-15-25 @ 09:15) (109/68 - 134/86)  RR: 18 (07-15-25 @ 09:15) (18 - 18)  SpO2: 99% (07-15-25 @ 09:15) (99% - 99%)  Wt(kg): --    I&O's Summary    14 Jul 2025 07:01  -  15 Jul 2025 07:00  --------------------------------------------------------  IN: 760 mL / OUT: 3 mL / NET: 757 mL                                           PHYSICAL EXAM:  Appearance: Normal, NAD  HEENT: Normal oral mucosa, PERRL, EOMI	  Neck: Supple, - JVD  Cardiovascular: Normal S1 S2, No murmurs  Respiratory: Lungs clear to auscultation, No Rales, Rhonchi, Wheezing	  Gastrointestinal:  Soft, Non-tender, + BS	  Skin: No rashes, No ecchymoses, No cyanosis  Extremities: Normal range of motion, No clubbing, cyanosis or edema  Vascular: Peripheral pulses palpable 2+ bilaterally  Neurologic: Non-focal  Psychiatry: A & O x 3, Mood & affect appropriate    LABS:	 	                            11.3   3.91  )-----------( 191      ( 15 Jul 2025 05:50 )             35.0     07-15    135  |  101  |  11  ----------------------------<  90  4.2   |  25  |  0.89    Ca    8.8      15 Jul 2025 05:50  Phos  3.5     07-14  Mg     2.1     07-15    TPro  6.9  /  Alb  3.8  /  TBili  0.6  /  DBili  x   /  AST  15  /  ALT  15  /  AlkPhos  103  07-14    proBNP:   Lipid Profile:   HgA1c:   TSH:    Cardiology PA Adult Progress Note    SUBJECTIVE ASSESSMENT: Patient seen and examined at bedside, doing well today. No acute events overnight. Currently denies CP, dizziness, palpitations, SOB, dyspnea, coughing, headaches, N/V/D/abdominal pain. Patient understands plan for the day.    MEDICATIONS:  spironolactone 25 milliGRAM(s) Oral daily  valsartan 40 milliGRAM(s) Oral daily  acetaminophen     Tablet .. 650 milliGRAM(s) Oral every 6 hours PRN  melatonin 5 milliGRAM(s) Oral at bedtime  aluminum hydroxide/magnesium hydroxide/simethicone Suspension 30 milliLiter(s) Oral every 4 hours PRN  dapagliflozin 10 milliGRAM(s) Oral daily  rosuvastatin 10 milliGRAM(s) Oral at bedtime  apixaban 5 milliGRAM(s) Oral every 12 hours  iron sucrose IVPB 300 milliGRAM(s) IV Intermittent every 24 hours  multivitamin 1 Tablet(s) Oral daily    	  VITAL SIGNS:  T(C): 36.6 (07-15-25 @ 09:15), Max: 36.7 (07-14-25 @ 15:39)  HR: 82 (07-15-25 @ 09:15) (75 - 87)  BP: 126/79 (07-15-25 @ 09:15) (109/68 - 134/86)  RR: 18 (07-15-25 @ 09:15) (18 - 18)  SpO2: 99% (07-15-25 @ 09:15) (99% - 99%)  Wt(kg): --    I&O's Summary    14 Jul 2025 07:01  -  15 Jul 2025 07:00  --------------------------------------------------------  IN: 760 mL / OUT: 3 mL / NET: 757 mL                                           PHYSICAL EXAM:  Appearance: Normal, NAD  HEENT: Normal oral mucosa, PERRL, EOMI	  Neck: Supple, - JVD  Cardiovascular: Normal S1 S2, No murmurs  Respiratory: Lungs clear to auscultation, No Rales, Rhonchi, Wheezing	  Gastrointestinal:  Soft, Non-tender, + BS	  Skin: No rashes, No ecchymoses, No cyanosis  Extremities: Normal range of motion, No clubbing, cyanosis or edema  Vascular: Peripheral pulses palpable 2+ bilaterally  Neurologic: Non-focal  Psychiatry: A & O x 3, Mood & affect appropriate    LABS:	 	                            11.3   3.91  )-----------( 191      ( 15 Jul 2025 05:50 )             35.0     07-15    135  |  101  |  11  ----------------------------<  90  4.2   |  25  |  0.89    Ca    8.8      15 Jul 2025 05:50  Phos  3.5     07-14  Mg     2.1     07-15    TPro  6.9  /  Alb  3.8  /  TBili  0.6  /  DBili  x   /  AST  15  /  ALT  15  /  AlkPhos  103  07-14

## 2025-07-15 NOTE — PROGRESS NOTE ADULT - PROBLEM SELECTOR PLAN 1
Patient admitted post syncopal episode, last episode 8/2024.  -Patient experienced sharp shooting chest pain radiating to back, palpitations, diaphoresis and blurry vision prior to episode.   -Reports head strike and LOC for 10-15 seconds  - CTH: no acute pathology   - EKG: HR 89bpm, NSR consistent with LVH, No acute changes from EKG in 8/2024, Trop <6   - TTE 8/2024 LV EF 55-60%, no valvular disease  Plan:   -TTE 7/14 EF 55% Normal LV &RV function   -Patient not tolerating orthostatics at this time, states too dizzy to stand.   - IV iron 300mg per pt request  -Plan for NST 7/16/25 AM, NPO at midnight. Patient admitted post syncopal episode, last episode 8/2024. Reports head strike and LOC for 10-15 s   - Patient experienced sharp shooting chest pain radiating to back, palpitations, diaphoresis and blurry vision prior to episode  - CTH: no acute pathology   - EKG: HR 89bpm, NSR consistent with LVH, No acute changes from EKG in 8/2024, Trop <6   - TTE 7/14/25 EF 55%, Normal LV & RV function   - Patient not tolerating orthostatics at this time, states too dizzy to stand.   - Plan for NST 7/16/25 AM, NPO at MN

## 2025-07-15 NOTE — PROGRESS NOTE ADULT - PROBLEM SELECTOR PLAN 4
Pt states -25lbs unintentional weight loss in the last two months and endorses night sweats   -Has been drinking ~ 6 ensures daily with no weight gain   -Consider inpatient CT Abd/Pelvis while admitted vs outpatient   - Nutrition consult     o Continue reg diet, nutrition unsure about weight loss, weight from previous admission the same as this admission, plan for reweigh
Pt states -25lbs unintentional weight loss in the last two months and endorses night sweats   -Has been drinking ~ 6 ensures daily with no weight gain   -Consider inpatient CT Abd/Pelvis while admitted vs outpatient   - Nutrition consult     o Continue reg diet, nutrition unsure about weight loss, weight from previous admission the same as this admission, plan for reweigh

## 2025-07-15 NOTE — PROGRESS NOTE ADULT - PROBLEM SELECTOR PLAN 6
SBPs 120s- 130s  - Monitor BPs, c/w meds as above      F: PO   E: Replete for K < 4, Mg <2  N: Regular Diet   DVT PPx :Eliquis     FULL CODE Ferritin 26, Iron sat 15, iron total 45, TIBC 291  - Start IV iron sucrose 300 mg IVP QD x 3 days (7/15-7/17)

## 2025-07-15 NOTE — PROGRESS NOTE ADULT - PROBLEM SELECTOR PROBLEM 3
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

## 2025-07-16 NOTE — PROVIDER CONTACT NOTE (OTHER) - RECOMMENDATIONS
For provider to advise patient of the importance of staying at the hospital and the risk of complications that may occur, such as a reoccurence of syncope and chest pain that may require interventions
To educate pt. on importance of IV incase of an emergency

## 2025-07-16 NOTE — PROVIDER CONTACT NOTE (OTHER) - ASSESSMENT
Pt. is A&Ox4. Pt. was fully clothed and came to the nurses station with telemetry box, requesting to leave.
Pt. appeared visibly upset and agitated, adamant on wanting IV to be taken out

## 2025-07-16 NOTE — PROVIDER CONTACT NOTE (OTHER) - ACTION/TREATMENT ORDERED:
Pt. educated by RN, NEWTON Salgado, and ILA Martins on importance of IV. IV taken out as per pt. request
ILA Martins educated pt on importance of staying at the hospital. Pt. still requested to leave AMA. Pt. adviced to seek medical attention if having symptoms

## 2025-07-16 NOTE — PROVIDER CONTACT NOTE (OTHER) - BACKGROUND
Pt. admitted for syncope, chest pain, and palpitations
Pt. admitted for syncope, chest pain, and palpitations
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
## 2025-07-25 DIAGNOSIS — R61 GENERALIZED HYPERHIDROSIS: ICD-10-CM

## 2025-07-25 DIAGNOSIS — Z98.890 OTHER SPECIFIED POSTPROCEDURAL STATES: ICD-10-CM

## 2025-07-25 DIAGNOSIS — I48.91 UNSPECIFIED ATRIAL FIBRILLATION: ICD-10-CM

## 2025-07-25 DIAGNOSIS — Z91.199 PATIENT'S NONCOMPLIANCE WITH OTHER MEDICAL TREATMENT AND REGIMEN DUE TO UNSPECIFIED REASON: ICD-10-CM

## 2025-07-25 DIAGNOSIS — E78.5 HYPERLIPIDEMIA, UNSPECIFIED: ICD-10-CM

## 2025-07-25 DIAGNOSIS — D50.9 IRON DEFICIENCY ANEMIA, UNSPECIFIED: ICD-10-CM

## 2025-07-25 DIAGNOSIS — I48.92 UNSPECIFIED ATRIAL FLUTTER: ICD-10-CM

## 2025-07-25 DIAGNOSIS — Z87.891 PERSONAL HISTORY OF NICOTINE DEPENDENCE: ICD-10-CM

## 2025-07-25 DIAGNOSIS — Z79.01 LONG TERM (CURRENT) USE OF ANTICOAGULANTS: ICD-10-CM

## 2025-07-25 DIAGNOSIS — Z18.89 OTHER SPECIFIED RETAINED FOREIGN BODY FRAGMENTS: ICD-10-CM
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
## 2025-07-25 DIAGNOSIS — R63.4 ABNORMAL WEIGHT LOSS: ICD-10-CM

## 2025-07-25 DIAGNOSIS — I50.22 CHRONIC SYSTOLIC (CONGESTIVE) HEART FAILURE: ICD-10-CM

## 2025-07-25 DIAGNOSIS — Z87.898 PERSONAL HISTORY OF OTHER SPECIFIED CONDITIONS: ICD-10-CM

## 2025-07-25 DIAGNOSIS — I11.0 HYPERTENSIVE HEART DISEASE WITH HEART FAILURE: ICD-10-CM

## 2025-07-25 DIAGNOSIS — S06.9X1A UNSPECIFIED INTRACRANIAL INJURY WITH LOSS OF CONSCIOUSNESS OF 30 MINUTES OR LESS, INITIAL ENCOUNTER: ICD-10-CM
